# Patient Record
Sex: MALE | Race: WHITE | Employment: OTHER | URBAN - METROPOLITAN AREA
[De-identification: names, ages, dates, MRNs, and addresses within clinical notes are randomized per-mention and may not be internally consistent; named-entity substitution may affect disease eponyms.]

---

## 2017-03-15 ENCOUNTER — APPOINTMENT (OUTPATIENT)
Dept: GENERAL RADIOLOGY | Facility: HOSPITAL | Age: 67
End: 2017-03-15
Attending: EMERGENCY MEDICINE
Payer: MEDICARE

## 2017-03-15 ENCOUNTER — HOSPITAL ENCOUNTER (EMERGENCY)
Facility: HOSPITAL | Age: 67
Discharge: HOME | End: 2017-03-15
Attending: EMERGENCY MEDICINE
Payer: MEDICARE

## 2017-03-15 VITALS
HEIGHT: 71 IN | SYSTOLIC BLOOD PRESSURE: 109 MMHG | TEMPERATURE: 99 F | OXYGEN SATURATION: 95 % | HEART RATE: 80 BPM | DIASTOLIC BLOOD PRESSURE: 60 MMHG | BODY MASS INDEX: 29.4 KG/M2 | RESPIRATION RATE: 16 BRPM | WEIGHT: 210 LBS

## 2017-03-15 DIAGNOSIS — J18.9 COMMUNITY ACQUIRED PNEUMONIA: Primary | ICD-10-CM

## 2017-03-15 LAB
BASOPHILS # BLD AUTO: 0.02 X10(3) UL (ref 0–0.1)
BASOPHILS NFR BLD AUTO: 0.3 %
BUN BLD-MCNC: 16 MG/DL (ref 8–20)
CALCIUM BLD-MCNC: 8.7 MG/DL (ref 8.3–10.3)
CHLORIDE: 106 MMOL/L (ref 101–111)
CO2: 26 MMOL/L (ref 22–32)
CREAT BLD-MCNC: 1.01 MG/DL (ref 0.7–1.3)
EOSINOPHIL # BLD AUTO: 0.01 X10(3) UL (ref 0–0.3)
EOSINOPHIL NFR BLD AUTO: 0.2 %
ERYTHROCYTE [DISTWIDTH] IN BLOOD BY AUTOMATED COUNT: 13.2 % (ref 11.5–16)
GLUCOSE BLD-MCNC: 105 MG/DL (ref 70–99)
HCT VFR BLD AUTO: 39.1 % (ref 37–53)
HGB BLD-MCNC: 13 G/DL (ref 13–17)
IMMATURE GRANULOCYTE COUNT: 0.01 X10(3) UL (ref 0–1)
IMMATURE GRANULOCYTE RATIO %: 0.2 %
LARGE PLATELETS: PRESENT
LYMPHOCYTES # BLD AUTO: 1.21 X10(3) UL (ref 0.9–4)
LYMPHOCYTES NFR BLD AUTO: 18.5 %
MCH RBC QN AUTO: 33.2 PG (ref 27–33.2)
MCHC RBC AUTO-ENTMCNC: 33.2 G/DL (ref 31–37)
MCV RBC AUTO: 100 FL (ref 80–99)
MONOCYTES # BLD AUTO: 0.55 X10(3) UL (ref 0.1–0.6)
MONOCYTES NFR BLD AUTO: 8.4 %
NEUTROPHIL ABS PRELIM: 4.74 X10 (3) UL (ref 1.3–6.7)
NEUTROPHILS # BLD AUTO: 4.74 X10(3) UL (ref 1.3–6.7)
NEUTROPHILS NFR BLD AUTO: 72.4 %
PLATELET # BLD AUTO: 155 10(3)UL (ref 150–450)
POTASSIUM SERPL-SCNC: 3.7 MMOL/L (ref 3.6–5.1)
RBC # BLD AUTO: 3.91 X10(6)UL (ref 3.8–5.8)
RED CELL DISTRIBUTION WIDTH-SD: 48.2 FL (ref 35.1–46.3)
SODIUM SERPL-SCNC: 139 MMOL/L (ref 136–144)
TROPONIN: <0.046 NG/ML (ref ?–0.05)
WBC # BLD AUTO: 6.5 X10(3) UL (ref 4–13)

## 2017-03-15 PROCEDURE — 96365 THER/PROPH/DIAG IV INF INIT: CPT

## 2017-03-15 PROCEDURE — 87999 UNLISTED MICROBIOLOGY PX: CPT

## 2017-03-15 PROCEDURE — 93005 ELECTROCARDIOGRAM TRACING: CPT

## 2017-03-15 PROCEDURE — 87633 RESP VIRUS 12-25 TARGETS: CPT | Performed by: EMERGENCY MEDICINE

## 2017-03-15 PROCEDURE — 93010 ELECTROCARDIOGRAM REPORT: CPT

## 2017-03-15 PROCEDURE — 96361 HYDRATE IV INFUSION ADD-ON: CPT

## 2017-03-15 PROCEDURE — 71020 XR CHEST PA + LAT CHEST (CPT=71020): CPT

## 2017-03-15 PROCEDURE — 36415 COLL VENOUS BLD VENIPUNCTURE: CPT

## 2017-03-15 PROCEDURE — 84484 ASSAY OF TROPONIN QUANT: CPT | Performed by: EMERGENCY MEDICINE

## 2017-03-15 PROCEDURE — 87581 M.PNEUMON DNA AMP PROBE: CPT | Performed by: EMERGENCY MEDICINE

## 2017-03-15 PROCEDURE — 99285 EMERGENCY DEPT VISIT HI MDM: CPT

## 2017-03-15 PROCEDURE — 80048 BASIC METABOLIC PNL TOTAL CA: CPT | Performed by: EMERGENCY MEDICINE

## 2017-03-15 PROCEDURE — 87040 BLOOD CULTURE FOR BACTERIA: CPT | Performed by: EMERGENCY MEDICINE

## 2017-03-15 PROCEDURE — 87798 DETECT AGENT NOS DNA AMP: CPT | Performed by: EMERGENCY MEDICINE

## 2017-03-15 PROCEDURE — 99284 EMERGENCY DEPT VISIT MOD MDM: CPT

## 2017-03-15 PROCEDURE — 87486 CHLMYD PNEUM DNA AMP PROBE: CPT | Performed by: EMERGENCY MEDICINE

## 2017-03-15 PROCEDURE — 85025 COMPLETE CBC W/AUTO DIFF WBC: CPT | Performed by: EMERGENCY MEDICINE

## 2017-03-15 RX ORDER — ASPIRIN 325 MG
325 TABLET ORAL DAILY
COMMUNITY

## 2017-03-15 RX ORDER — AZITHROMYCIN 250 MG/1
TABLET, FILM COATED ORAL
Qty: 1 PACKAGE | Refills: 0 | Status: SHIPPED | OUTPATIENT
Start: 2017-03-15 | End: 2017-03-20

## 2017-03-15 RX ORDER — AZITHROMYCIN 250 MG/1
500 TABLET, FILM COATED ORAL ONCE
Status: COMPLETED | OUTPATIENT
Start: 2017-03-15 | End: 2017-03-15

## 2017-03-15 NOTE — ED PROVIDER NOTES
Patient Seen in: [facility] Emergency Department    History   Patient presents with:  Fever Sepsis (infectious)    Stated Complaint: fever, chills x 5 days    HPI    Ted Suárez is a 53-year-old male coming with complaints of fever.   Patient had fever and auricular preauricular or mastoid tenderness. Oropharyngeal exam shows no uvula edema or shift. There is no tongue elevation and palatine tonsils show no purulent material or erythema.   No submandibular erythema and no tenderness along the sternocleidoma intervals and axes as noted on EKG Report.   Rate: 66  Rhythm: Sinus Rhythm  Reading: No areas of acute ST segment elevation or depression            MDM     Patient has no signs of hypoxia so he is good outpatient candidate he will be discharged home he wa

## 2017-03-15 NOTE — ED INITIAL ASSESSMENT (HPI)
Pt to ED from home with c/o fever, chills, cough, decreased appetite x5 days. Unsure of temperature, did not check at home.

## 2017-03-16 LAB
ATRIAL RATE: 66 BPM
P AXIS: 60 DEGREES
P-R INTERVAL: 130 MS
Q-T INTERVAL: 382 MS
QRS DURATION: 86 MS
QTC CALCULATION (BEZET): 400 MS
R AXIS: 17 DEGREES
T AXIS: 32 DEGREES
VENTRICULAR RATE: 66 BPM

## 2017-05-23 ENCOUNTER — HOSPITAL ENCOUNTER (OUTPATIENT)
Dept: GENERAL RADIOLOGY | Facility: HOSPITAL | Age: 67
Discharge: HOME | End: 2017-05-23
Attending: INTERNAL MEDICINE
Payer: MEDICARE

## 2017-05-23 DIAGNOSIS — J18.9 PNEUMONIA: ICD-10-CM

## 2017-05-23 PROCEDURE — 71020 XR CHEST PA + LAT CHEST (CPT=71020): CPT | Performed by: INTERNAL MEDICINE

## 2020-06-18 ENCOUNTER — HOSPITAL ENCOUNTER (OUTPATIENT)
Dept: CT IMAGING | Facility: HOSPITAL | Age: 70
Discharge: HOME | End: 2020-06-18
Attending: INTERNAL MEDICINE
Payer: MEDICARE

## 2020-06-18 DIAGNOSIS — R31.9 HEMATURIA: ICD-10-CM

## 2020-06-18 PROCEDURE — 74178 CT ABD&PLV WO CNTR FLWD CNTR: CPT | Performed by: INTERNAL MEDICINE

## 2020-06-18 PROCEDURE — 82565 ASSAY OF CREATININE: CPT

## 2020-07-20 ENCOUNTER — OFFICE VISIT (OUTPATIENT)
Dept: SURGERY | Facility: CLINIC | Age: 70
End: 2020-07-20
Payer: MEDICARE

## 2020-07-20 VITALS — SYSTOLIC BLOOD PRESSURE: 123 MMHG | DIASTOLIC BLOOD PRESSURE: 80 MMHG | HEART RATE: 54 BPM

## 2020-07-20 DIAGNOSIS — N20.0 RENAL CALCULI: ICD-10-CM

## 2020-07-20 DIAGNOSIS — N21.0 BLADDER CALCULUS: ICD-10-CM

## 2020-07-20 DIAGNOSIS — N28.1 BILATERAL RENAL CYSTS: ICD-10-CM

## 2020-07-20 DIAGNOSIS — N40.0 ENLARGED PROSTATE: ICD-10-CM

## 2020-07-20 DIAGNOSIS — R82.90 URINE FINDING: Primary | ICD-10-CM

## 2020-07-20 DIAGNOSIS — R39.9 LOWER URINARY TRACT SYMPTOMS: ICD-10-CM

## 2020-07-20 LAB
APPEARANCE: CLEAR
MULTISTIX LOT#: 1044 NUMERIC
PH, URINE: 5.5 (ref 4.5–8)
SPECIFIC GRAVITY: 1.25 (ref 1–1.03)
UROBILINOGEN,SEMI-QN: 1 MG/DL (ref 0–1.9)

## 2020-07-20 PROCEDURE — 76856 US EXAM PELVIC COMPLETE: CPT | Performed by: UROLOGY

## 2020-07-20 PROCEDURE — 81003 URINALYSIS AUTO W/O SCOPE: CPT | Performed by: UROLOGY

## 2020-07-20 PROCEDURE — 99203 OFFICE O/P NEW LOW 30 MIN: CPT | Performed by: UROLOGY

## 2020-07-20 NOTE — PROGRESS NOTES
Rooming Clinician:     Esperanza Mere is a 78year old male.   Patient presents with:  Consult: CT scan shows kidney stones  Kidney Stones / Ureteral Stone  Pain: No   left  Nausea: No    Vomiting: No    Previous Stones: Yes: yes in 2015-passed    Chemical A Normal appendix. ABDOMINAL WALL:  Stable small fat containing umbilical hernia. Postsurgical changes of hernia repair in the left lower quadrant.   URINARY BLADDER:  2 adjacent calculi in the dependent aspect of the bladder lumen on the left, measuring 9 Alcohol use: Not on file    Drug use: Not on file       REVIEW OF SYSTEMS:     GENERAL HEALTH: feels well otherwise  SKIN: denies any unusual skin lesions or rashes  RESPIRATORY: denies shortness of breath with exertion  CARDIOVASCULAR: denies chest pain o symptoms  Renal calculi  Bilateral renal cysts    PLAN:     Tamsulosin 0.4 mg p.o. daily  Casual cystoscopy, cystolitholopaxy under general anesthesia. High-energy holmium laser.     I have discussed BPH with the patient including its pathophysiology and m

## 2020-07-24 ENCOUNTER — TELEPHONE (OUTPATIENT)
Dept: SURGERY | Facility: CLINIC | Age: 70
End: 2020-07-24

## 2020-07-24 DIAGNOSIS — N21.0 BLADDER STONE: Primary | ICD-10-CM

## 2020-07-24 NOTE — TELEPHONE ENCOUNTER
RN called patient that his request will be forwarded to the Surgery Scheduler. See Dr Austin Jeffers notes on 7/20:  PLAN:      Tamsulosin 0.4 mg p.o. daily  Casual cystoscopy, cystolitholopaxy under general anesthesia. High-energy holmium laser.     Alexis

## 2020-07-31 NOTE — TELEPHONE ENCOUNTER
cystoscopy, cystolitholopaxy under general anesthesia. High-energy holmium laser scheduled for 9/16/20 at [facility]. Reviewed pre op instructions with patient.  Verbalized understanding.

## 2020-09-10 RX ORDER — EZETIMIBE 10 MG/1
10 TABLET ORAL NIGHTLY
COMMUNITY

## 2020-09-14 ENCOUNTER — APPOINTMENT (OUTPATIENT)
Dept: LAB | Facility: HOSPITAL | Age: 70
End: 2020-09-14
Attending: UROLOGY
Payer: MEDICARE

## 2020-09-14 DIAGNOSIS — N21.0 BLADDER STONE: ICD-10-CM

## 2020-09-14 NOTE — H&P
The patient has a history of several episodes of gross hematuria over the last several months. He noted some asymptomatic dark-colored urine which cleared over the course of at least 24 hours. He had no pain at that time.   He has a history of a kidne and size. The prostate exam is enlarged, about 35 to 40 cc and benign. BACK: no CVA tenderness  NEURO: grossly normal  EXTREMITIES: no cyanosis, clubbing or edema        The patient was allowed to void and the lower abdomen was scanned.   At this point explained the rationale for using them in the treatment of lower urinary tract symptoms due to an enlarged prostate and at times to encourage the passage of ureteral calculi. I explained the side effects of orthostatic hypotension, asthenia, and dizziness.

## 2020-09-15 ENCOUNTER — ANESTHESIA EVENT (OUTPATIENT)
Dept: SURGERY | Facility: HOSPITAL | Age: 70
End: 2020-09-15
Payer: MEDICARE

## 2020-09-15 LAB — SARS-COV-2 RNA RESP QL NAA+PROBE: NOT DETECTED

## 2020-09-16 ENCOUNTER — ANESTHESIA (OUTPATIENT)
Dept: SURGERY | Facility: HOSPITAL | Age: 70
End: 2020-09-16
Payer: MEDICARE

## 2020-09-16 ENCOUNTER — TELEPHONE (OUTPATIENT)
Dept: SURGERY | Facility: CLINIC | Age: 70
End: 2020-09-16

## 2020-09-16 ENCOUNTER — HOSPITAL ENCOUNTER (OUTPATIENT)
Facility: HOSPITAL | Age: 70
Setting detail: HOSPITAL OUTPATIENT SURGERY
Discharge: HOME | End: 2020-09-16
Attending: UROLOGY | Admitting: UROLOGY
Payer: MEDICARE

## 2020-09-16 ENCOUNTER — APPOINTMENT (OUTPATIENT)
Dept: GENERAL RADIOLOGY | Facility: HOSPITAL | Age: 70
End: 2020-09-16
Attending: UROLOGY
Payer: MEDICARE

## 2020-09-16 VITALS
SYSTOLIC BLOOD PRESSURE: 118 MMHG | BODY MASS INDEX: 29.23 KG/M2 | WEIGHT: 208.75 LBS | OXYGEN SATURATION: 100 % | HEIGHT: 71 IN | DIASTOLIC BLOOD PRESSURE: 78 MMHG | RESPIRATION RATE: 18 BRPM | TEMPERATURE: 98 F | HEART RATE: 73 BPM

## 2020-09-16 DIAGNOSIS — N21.0 BLADDER STONE: Primary | ICD-10-CM

## 2020-09-16 PROCEDURE — 0TCB8ZZ EXTIRPATION OF MATTER FROM BLADDER, VIA NATURAL OR ARTIFICIAL OPENING ENDOSCOPIC: ICD-10-PCS | Performed by: UROLOGY

## 2020-09-16 PROCEDURE — 52317 REMOVE BLADDER STONE: CPT | Performed by: UROLOGY

## 2020-09-16 RX ORDER — PHENYLEPHRINE HCL 10 MG/ML
VIAL (ML) INJECTION AS NEEDED
Status: DISCONTINUED | OUTPATIENT
Start: 2020-09-16 | End: 2020-09-16 | Stop reason: SURG

## 2020-09-16 RX ORDER — CEFAZOLIN SODIUM/WATER 2 G/20 ML
2 SYRINGE (ML) INTRAVENOUS ONCE
Status: COMPLETED | OUTPATIENT
Start: 2020-09-16 | End: 2020-09-16

## 2020-09-16 RX ORDER — SODIUM CHLORIDE, SODIUM LACTATE, POTASSIUM CHLORIDE, CALCIUM CHLORIDE 600; 310; 30; 20 MG/100ML; MG/100ML; MG/100ML; MG/100ML
INJECTION, SOLUTION INTRAVENOUS CONTINUOUS
Status: DISCONTINUED | OUTPATIENT
Start: 2020-09-16 | End: 2020-09-16

## 2020-09-16 RX ORDER — HYDROMORPHONE HYDROCHLORIDE 1 MG/ML
0.4 INJECTION, SOLUTION INTRAMUSCULAR; INTRAVENOUS; SUBCUTANEOUS EVERY 5 MIN PRN
Status: DISCONTINUED | OUTPATIENT
Start: 2020-09-16 | End: 2020-09-16

## 2020-09-16 RX ORDER — ONDANSETRON 2 MG/ML
INJECTION INTRAMUSCULAR; INTRAVENOUS AS NEEDED
Status: DISCONTINUED | OUTPATIENT
Start: 2020-09-16 | End: 2020-09-16 | Stop reason: SURG

## 2020-09-16 RX ORDER — HYDROCODONE BITARTRATE AND ACETAMINOPHEN 5; 325 MG/1; MG/1
2 TABLET ORAL AS NEEDED
Status: DISCONTINUED | OUTPATIENT
Start: 2020-09-16 | End: 2020-09-16

## 2020-09-16 RX ORDER — HYDROCODONE BITARTRATE AND ACETAMINOPHEN 5; 325 MG/1; MG/1
1 TABLET ORAL AS NEEDED
Status: DISCONTINUED | OUTPATIENT
Start: 2020-09-16 | End: 2020-09-16

## 2020-09-16 RX ORDER — ACETAMINOPHEN 500 MG
1000 TABLET ORAL ONCE
COMMUNITY

## 2020-09-16 RX ORDER — DEXAMETHASONE SODIUM PHOSPHATE 4 MG/ML
VIAL (ML) INJECTION AS NEEDED
Status: DISCONTINUED | OUTPATIENT
Start: 2020-09-16 | End: 2020-09-16 | Stop reason: SURG

## 2020-09-16 RX ORDER — ROCURONIUM BROMIDE 10 MG/ML
INJECTION, SOLUTION INTRAVENOUS AS NEEDED
Status: DISCONTINUED | OUTPATIENT
Start: 2020-09-16 | End: 2020-09-16 | Stop reason: SURG

## 2020-09-16 RX ORDER — ACETAMINOPHEN 500 MG
1000 TABLET ORAL ONCE AS NEEDED
Status: DISCONTINUED | OUTPATIENT
Start: 2020-09-16 | End: 2020-09-16

## 2020-09-16 RX ORDER — LIDOCAINE HYDROCHLORIDE 10 MG/ML
INJECTION, SOLUTION EPIDURAL; INFILTRATION; INTRACAUDAL; PERINEURAL AS NEEDED
Status: DISCONTINUED | OUTPATIENT
Start: 2020-09-16 | End: 2020-09-16 | Stop reason: SURG

## 2020-09-16 RX ORDER — METOCLOPRAMIDE HYDROCHLORIDE 5 MG/ML
INJECTION INTRAMUSCULAR; INTRAVENOUS AS NEEDED
Status: DISCONTINUED | OUTPATIENT
Start: 2020-09-16 | End: 2020-09-16 | Stop reason: SURG

## 2020-09-16 RX ORDER — ACETAMINOPHEN 500 MG
1000 TABLET ORAL ONCE
Status: DISCONTINUED | OUTPATIENT
Start: 2020-09-16 | End: 2020-09-16 | Stop reason: HOSPADM

## 2020-09-16 RX ORDER — NALOXONE HYDROCHLORIDE 0.4 MG/ML
80 INJECTION, SOLUTION INTRAMUSCULAR; INTRAVENOUS; SUBCUTANEOUS AS NEEDED
Status: DISCONTINUED | OUTPATIENT
Start: 2020-09-16 | End: 2020-09-16

## 2020-09-16 RX ADMIN — ROCURONIUM BROMIDE 50 MG: 10 INJECTION, SOLUTION INTRAVENOUS at 07:45:00

## 2020-09-16 RX ADMIN — METOCLOPRAMIDE HYDROCHLORIDE 10 MG: 5 INJECTION INTRAMUSCULAR; INTRAVENOUS at 07:45:00

## 2020-09-16 RX ADMIN — LIDOCAINE HYDROCHLORIDE 50 MG: 10 INJECTION, SOLUTION EPIDURAL; INFILTRATION; INTRACAUDAL; PERINEURAL at 07:45:00

## 2020-09-16 RX ADMIN — PHENYLEPHRINE HCL 100 MCG: 10 MG/ML VIAL (ML) INJECTION at 08:02:00

## 2020-09-16 RX ADMIN — DEXAMETHASONE SODIUM PHOSPHATE 4 MG: 4 MG/ML VIAL (ML) INJECTION at 07:45:00

## 2020-09-16 RX ADMIN — CEFAZOLIN SODIUM/WATER 2 G: 2 G/20 ML SYRINGE (ML) INTRAVENOUS at 07:54:00

## 2020-09-16 RX ADMIN — SODIUM CHLORIDE, SODIUM LACTATE, POTASSIUM CHLORIDE, CALCIUM CHLORIDE: 600; 310; 30; 20 INJECTION, SOLUTION INTRAVENOUS at 08:26:00

## 2020-09-16 RX ADMIN — PHENYLEPHRINE HCL 100 MCG: 10 MG/ML VIAL (ML) INJECTION at 08:05:00

## 2020-09-16 RX ADMIN — PHENYLEPHRINE HCL 100 MCG: 10 MG/ML VIAL (ML) INJECTION at 08:12:00

## 2020-09-16 RX ADMIN — ONDANSETRON 4 MG: 2 INJECTION INTRAMUSCULAR; INTRAVENOUS at 07:45:00

## 2020-09-16 NOTE — OPERATIVE REPORT
Operative Note    Patient Name: Ananth Gaona    Date of Procedure: 9/16/2020    Preoperative Diagnosis: Bladder calculus, BPH    Postoperative Diagnosis: Same    Primary Surgeon: Neisha Malloy. Russell Waller M.D.      Procedure Performed: Cystoscopy, cystosco scan were then visualized. 2 smaller ones were irrigated from the bladder and the larger 9 mm stone was then fractured into multiple small pieces using the holmium laser. Pieces were irrigated from the bladder.   Further inspection of the bladder using th

## 2020-09-16 NOTE — ANESTHESIA PREPROCEDURE EVALUATION
PRE-OP EVALUATION    Patient Name: Julinaa Beltran    Pre-op Diagnosis: Bladder stone [N21.0]    Procedure(s):  cystoscopy, cystolitholapaxy with  High-energy holmium laser.     Surgeon(s) and Role:     * Bridgette Arenas MD - Primary    Pre-op vital use: Unknown     Available pre-op labs reviewed. Airway      Mallampati: II  Mouth opening: >3 FB  TM distance: > 6 cm  Neck ROM: full Cardiovascular      Rhythm: regular  Rate: normal     Dental    No notable dental history.          Pulmonar

## 2020-09-16 NOTE — ANESTHESIA PROCEDURE NOTES
Airway  Date/Time: 9/16/2020 7:48 AM  Urgency: elective    Airway not difficult    General Information and Staff    Patient location during procedure: OR  Anesthesiologist: Abdelrahman Gray MD  Performed: anesthesiologist     Indications and Patient Cond

## 2020-09-16 NOTE — INTERVAL H&P NOTE
Pre-op Diagnosis: Bladder stone [N21.0]    The above referenced H&P was reviewed by Solomon Arriola MD on 9/16/2020, the patient was examined and no significant changes have occurred in the patient's condition since the H&P was performed.   I discussed wi

## 2020-09-16 NOTE — ANESTHESIA POSTPROCEDURE EVALUATION
[facility] Patient Status:  Hospital Outpatient Surgery   Age/Gender 78year old male MRN [id number]   [facility] SURGERY Attending Chip Andrade MD   Hosp Day # 0 PCP Pari Franz MD       Anesthesia Post-op

## 2020-09-20 LAB
CALCULI MASS: 320 MG
CALCULI NUMBER: 5

## 2020-10-01 ENCOUNTER — OFFICE VISIT (OUTPATIENT)
Dept: SURGERY | Facility: CLINIC | Age: 70
End: 2020-10-01
Payer: MEDICARE

## 2020-10-01 VITALS — DIASTOLIC BLOOD PRESSURE: 71 MMHG | HEART RATE: 71 BPM | SYSTOLIC BLOOD PRESSURE: 116 MMHG

## 2020-10-01 DIAGNOSIS — R82.90 URINE FINDING: Primary | ICD-10-CM

## 2020-10-01 DIAGNOSIS — N21.0 BLADDER CALCULI: ICD-10-CM

## 2020-10-01 PROCEDURE — 99213 OFFICE O/P EST LOW 20 MIN: CPT | Performed by: UROLOGY

## 2020-10-01 PROCEDURE — 81003 URINALYSIS AUTO W/O SCOPE: CPT | Performed by: UROLOGY

## 2020-10-01 NOTE — PROGRESS NOTES
Rooming Clinician:     Temitope Tripp is a 78year old male. Patient presents with: Follow - Up: c/o bladder stones, S/P CYSTOSCOPY LITHOTRIPSY        HPI:     Patient returns for follow-up. He had recent cystoscopy with cystoscopy litholapaxy.   [place] SYSTEMS:     GENERAL HEALTH: feels well otherwise  SKIN: denies any unusual skin lesions or rashes  RESPIRATORY: denies shortness of breath with exertion  CARDIOVASCULAR: denies chest pain on exertion  GI: denies abdominal pain and denies heartburn  : se

## 2021-01-15 ENCOUNTER — HOSPITAL ENCOUNTER (OUTPATIENT)
Age: 71
Discharge: HOME | End: 2021-01-15
Payer: MEDICARE

## 2021-01-15 VITALS
WEIGHT: 205 LBS | OXYGEN SATURATION: 97 % | HEART RATE: 68 BPM | TEMPERATURE: 98 F | BODY MASS INDEX: 28.7 KG/M2 | HEIGHT: 71 IN | SYSTOLIC BLOOD PRESSURE: 122 MMHG | DIASTOLIC BLOOD PRESSURE: 71 MMHG | RESPIRATION RATE: 18 BRPM

## 2021-01-15 DIAGNOSIS — Z20.822 ENCOUNTER FOR LABORATORY TESTING FOR COVID-19 VIRUS: Primary | ICD-10-CM

## 2021-01-15 LAB — SARS-COV-2 RNA RESP QL NAA+PROBE: NOT DETECTED

## 2021-01-15 PROCEDURE — 99202 OFFICE O/P NEW SF 15 MIN: CPT | Performed by: PHYSICIAN ASSISTANT

## 2021-01-15 NOTE — ED INITIAL ASSESSMENT (HPI)
Pt states he is her for COVID-19 test due to his wife having some s/s including sweating this morning and feeling like she had some CP x over the past 3 nights. Pt denies any s/s or known exposure to anyone with COVID-19.

## 2021-01-15 NOTE — ED PROVIDER NOTES
Patient Seen in: [facility] [address] [place], [place] [address]way      History   Patient presents with:  Covid-19 Test: Entered by patient    Stated Complaint: Covid-19 Test, EXPOSURE    HPI/Subjective:   HPI    CHIEF COMPLAINT: Covid test    HISTORY OF PRESENT ILLNESS: above.    Physical Exam     ED Triage Vitals [01/15/21 1115]   /71   Pulse 68   Resp 18   Temp 97.7 °F (36.5 °C)   Temp src Temporal   SpO2 97 %   O2 Device None (Room air)       Current:/71   Pulse 68   Temp 97.7 °F (36.5 °C) (Temporal)   Resp Prescribed:  Current Discharge Medication List

## 2021-04-01 ENCOUNTER — OFFICE VISIT (OUTPATIENT)
Dept: SURGERY | Facility: CLINIC | Age: 71
End: 2021-04-01
Payer: MEDICARE

## 2021-04-01 VITALS — HEART RATE: 65 BPM | SYSTOLIC BLOOD PRESSURE: 118 MMHG | DIASTOLIC BLOOD PRESSURE: 81 MMHG | TEMPERATURE: 97 F

## 2021-04-01 DIAGNOSIS — N40.0 ENLARGED PROSTATE: ICD-10-CM

## 2021-04-01 DIAGNOSIS — R33.9 INCOMPLETE EMPTYING OF BLADDER: ICD-10-CM

## 2021-04-01 DIAGNOSIS — R82.90 URINE FINDING: Primary | ICD-10-CM

## 2021-04-01 PROCEDURE — 76856 US EXAM PELVIC COMPLETE: CPT | Performed by: UROLOGY

## 2021-04-01 PROCEDURE — 81003 URINALYSIS AUTO W/O SCOPE: CPT | Performed by: UROLOGY

## 2021-04-01 PROCEDURE — 99213 OFFICE O/P EST LOW 20 MIN: CPT | Performed by: UROLOGY

## 2021-04-01 RX ORDER — TAMSULOSIN HYDROCHLORIDE 0.4 MG/1
0.4 CAPSULE ORAL DAILY
Qty: 90 CAPSULE | Refills: 3 | Status: SHIPPED | OUTPATIENT
Start: 2021-04-01 | End: 2021-05-01

## 2021-04-01 NOTE — PROGRESS NOTES
Rooming Clinician:     Jhony Yuan is a 69year old male. Patient presents with:   Follow - Up: h/o bladder stones        HPI:     Patient returns for follow-up exam.  He has a history of bladder stones for which he underwent cystolitholopaxy last breath with exertion  CARDIOVASCULAR: denies chest pain on exertion  GI: denies abdominal pain and denies heartburn  : see HPI  NEURO: no sensory or motor complaint    EXAM:     /81   Pulse 65   Temp 97.2 °F (36.2 °C)   GENERAL: well developed, wel various surgical procedures including TURP, photo vaporization of the prostate, open surgery, robotic suprapubic prostatectomy, vapoenucleation of the prostate as well as various interstitial treatments such as radiofrequency ablation, laser ablation, Urol

## 2021-04-01 NOTE — PATIENT INSTRUCTIONS
Text SUPPORT1 to 13354 to learn if you may be eligible for financial support with your medication(s). Msg & Data Rates May Apply. Msg freq varies. Terms apply. Text HELP for help. Text STOP to end.    Tamsulosin capsules  Brand Name: Flomax  What is th What if I miss a dose? If you miss a dose, take it as soon as you can. If it is almost time for your next dose, take only that dose. Do not take double or extra doses.  If you stop taking your medicine for several days or more, ask your doctor or health ca of a serious problem and must be treated right away to prevent permanent damage. If you are thinking of having cataract surgery, tell your eye surgeon that you have taken this medicine. NOTE:This sheet is a summary.  It may not cover all possible informat

## 2021-07-13 ENCOUNTER — OFFICE VISIT (OUTPATIENT)
Dept: SURGERY | Facility: CLINIC | Age: 71
End: 2021-07-13
Payer: MEDICARE

## 2021-07-13 DIAGNOSIS — N40.0 ENLARGED PROSTATE: ICD-10-CM

## 2021-07-13 DIAGNOSIS — R82.90 URINE FINDING: Primary | ICD-10-CM

## 2021-07-13 DIAGNOSIS — R33.9 INCOMPLETE EMPTYING OF BLADDER: ICD-10-CM

## 2021-07-13 LAB
APPEARANCE: CLEAR
BILIRUBIN: NEGATIVE
GLUCOSE (URINE DIPSTICK): NEGATIVE MG/DL
KETONES (URINE DIPSTICK): NEGATIVE MG/DL
LEUKOCYTES: NEGATIVE
MULTISTIX LOT#: NORMAL NUMERIC
NITRITE, URINE: NEGATIVE
OCCULT BLOOD: NEGATIVE
PH, URINE: 5.5 (ref 4.5–8)
PROTEIN (URINE DIPSTICK): NEGATIVE MG/DL
SPECIFIC GRAVITY: 1.01 (ref 1–1.03)
UROBILINOGEN,SEMI-QN: 0.2 MG/DL (ref 0–1.9)

## 2021-07-13 PROCEDURE — 81003 URINALYSIS AUTO W/O SCOPE: CPT | Performed by: UROLOGY

## 2021-07-13 PROCEDURE — 99213 OFFICE O/P EST LOW 20 MIN: CPT | Performed by: UROLOGY

## 2021-07-13 PROCEDURE — 51798 US URINE CAPACITY MEASURE: CPT | Performed by: UROLOGY

## 2021-07-13 RX ORDER — TAMSULOSIN HYDROCHLORIDE 0.4 MG/1
CAPSULE ORAL
COMMUNITY
Start: 2021-06-27

## 2021-07-13 NOTE — PROGRESS NOTES
Rooming Clinician:     Byron Plunkett is a 69year old male. Patient presents with: Follow - Up: Here for follow up and PVR        HPI:     Patient has a history of a bladder calculus status post cystoscopy litholapaxy.   Several months ago he was n denies abdominal pain and denies heartburn  : see HPI  NEURO: no sensory or motor complaint    EXAM:     There were no vitals taken for this visit.   GENERAL: well developed, well nourished,in no apparent distress  SKIN: no rashes,no suspicious lesions  H

## 2021-11-09 ENCOUNTER — OFFICE VISIT (OUTPATIENT)
Dept: SURGERY | Facility: CLINIC | Age: 71
End: 2021-11-09
Payer: MEDICARE

## 2021-11-09 DIAGNOSIS — R39.9 LOWER URINARY TRACT SYMPTOMS: ICD-10-CM

## 2021-11-09 DIAGNOSIS — R33.9 INCOMPLETE EMPTYING OF BLADDER: ICD-10-CM

## 2021-11-09 DIAGNOSIS — N40.0 ENLARGED PROSTATE: ICD-10-CM

## 2021-11-09 DIAGNOSIS — R82.90 URINE FINDING: Primary | ICD-10-CM

## 2021-11-09 PROCEDURE — 76856 US EXAM PELVIC COMPLETE: CPT | Performed by: UROLOGY

## 2021-11-09 PROCEDURE — 99213 OFFICE O/P EST LOW 20 MIN: CPT | Performed by: UROLOGY

## 2021-11-09 PROCEDURE — 81003 URINALYSIS AUTO W/O SCOPE: CPT | Performed by: UROLOGY

## 2021-11-09 NOTE — PROGRESS NOTES
Post-Op Assessment Note      CV Status:  Stable    Mental Status:  Alert and awake    Hydration Status:  Euvolemic    PONV Controlled:  Controlled    Airway Patency:  Patent    Post Op Vitals Reviewed: Yes          Staff: CRNA           /71 (12/31/18 1039)    Temp      Pulse 66 (12/31/18 1039)   Resp 16 (12/31/18 1039)    SpO2 97 % (12/31/18 1039) Rooming Clinician:     Micaela Luna is a 66year old male. Patient presents with: Follow - Up: BPH        HPI:     Patient returns for follow-up exam.  He has a history of bladder calculus and underwent cystoscopy and cystolitholapaxy 1 year ago. denies chest pain on exertion  GI: denies abdominal pain and denies heartburn  : see HPI  NEURO: no sensory or motor complaint    EXAM:     There were no vitals taken for this visit.   GENERAL: well developed, well nourished,in no apparent distress  SKIN: ultrasound in office    Diagnoses and all orders for this visit:    Urine finding  -     URINALYSIS, AUTO, W/O SCOPE    Enlarged prostate    Incomplete emptying of bladder    Lower urinary tract symptoms        Follow up examination in 6 months    The hortencia

## 2022-05-04 RX ORDER — TAMSULOSIN HYDROCHLORIDE 0.4 MG/1
CAPSULE ORAL
Qty: 90 CAPSULE | Refills: 2 | Status: SHIPPED | OUTPATIENT
Start: 2022-05-04

## 2022-10-18 ENCOUNTER — PATIENT MESSAGE (OUTPATIENT)
Dept: SURGERY | Facility: CLINIC | Age: 72
End: 2022-10-18

## 2022-10-18 ENCOUNTER — TELEPHONE (OUTPATIENT)
Dept: SURGERY | Facility: CLINIC | Age: 72
End: 2022-10-18

## 2022-10-18 DIAGNOSIS — R31.0 GROSS HEMATURIA: Primary | ICD-10-CM

## 2022-10-19 ENCOUNTER — PATIENT MESSAGE (OUTPATIENT)
Dept: SURGERY | Facility: CLINIC | Age: 72
End: 2022-10-19

## 2022-10-19 NOTE — TELEPHONE ENCOUNTER
Pt states he had a small clump of blood come while urinating on Monday night. States he passed this small clump of blood and then after he passed this he then urinated yellow urine. Pt states he has not noticed any further blood in urine since then. Today pt states urine is light yellow. Denies burning, denies foul smell, no cloudiness, denies pain as well. Asked pt if he was straining Monday night while urinating and pt denies. Denies fevers or flu-like sx. Pt has history of bladder stones 2021, pt is concerned about this episode being related but states last year blood was mixed in with urine and not isolated as a clump like he noticed on Monday night. Pt is currently in [place] and will be back next week for one week and then leaving out of country after next week. Wondering if he needs to complete any testing for this while he is in town. Routed to Dr Evette Fox;  Please advise, thank you.

## 2022-10-19 NOTE — TELEPHONE ENCOUNTER
Pt sent a mychart appointment request for 10-24-22 thru 10-28-22 for discharge blood in the urine on Monday night 10-17-22. No appointments available.   Please call pt

## 2022-10-25 ENCOUNTER — LAB ENCOUNTER (OUTPATIENT)
Dept: LAB | Facility: HOSPITAL | Age: 72
End: 2022-10-25
Attending: UROLOGY
Payer: MEDICARE

## 2022-10-25 ENCOUNTER — HOSPITAL ENCOUNTER (OUTPATIENT)
Dept: GENERAL RADIOLOGY | Facility: HOSPITAL | Age: 72
Discharge: HOME | End: 2022-10-25
Attending: UROLOGY
Payer: MEDICARE

## 2022-10-25 DIAGNOSIS — R31.0 GROSS HEMATURIA: ICD-10-CM

## 2022-10-25 LAB
BILIRUB UR QL STRIP.AUTO: NEGATIVE
CLARITY UR REFRACT.AUTO: CLEAR
COLOR UR AUTO: YELLOW
GLUCOSE UR STRIP.AUTO-MCNC: NEGATIVE MG/DL
KETONES UR STRIP.AUTO-MCNC: NEGATIVE MG/DL
NITRITE UR QL STRIP.AUTO: NEGATIVE
PH UR STRIP.AUTO: 6 [PH] (ref 5–8)
PROT UR STRIP.AUTO-MCNC: NEGATIVE MG/DL
RBC UR QL AUTO: NEGATIVE
SP GR UR STRIP.AUTO: 1.02 (ref 1–1.03)
UROBILINOGEN UR STRIP.AUTO-MCNC: <2 MG/DL

## 2022-10-25 PROCEDURE — 81001 URINALYSIS AUTO W/SCOPE: CPT

## 2022-10-25 PROCEDURE — 74018 RADEX ABDOMEN 1 VIEW: CPT | Performed by: UROLOGY

## 2022-10-25 PROCEDURE — 87086 URINE CULTURE/COLONY COUNT: CPT

## 2022-10-25 NOTE — PROGRESS NOTES
Your recent KUB x-ray is normal.  Shows no evidence of bladder stones. Recommend follow up in the office as directed.     Sincerely,  Sae Voss MD

## 2022-10-25 NOTE — PROGRESS NOTES
Your recent urinalysis shows no evidence of red blood cells, white blood cells or bacteria and is normal.  Recommend follow up in the office as directed.     Sincerely,  Sandhya Jsoeph MD

## 2022-10-26 ENCOUNTER — HOSPITAL ENCOUNTER (OUTPATIENT)
Dept: ULTRASOUND IMAGING | Age: 72
Discharge: HOME | End: 2022-10-26
Attending: UROLOGY
Payer: MEDICARE

## 2022-10-26 DIAGNOSIS — R31.0 GROSS HEMATURIA: ICD-10-CM

## 2022-10-26 PROCEDURE — 76770 US EXAM ABDO BACK WALL COMP: CPT | Performed by: UROLOGY

## 2022-10-27 ENCOUNTER — PATIENT MESSAGE (OUTPATIENT)
Dept: SURGERY | Facility: CLINIC | Age: 72
End: 2022-10-27

## 2022-10-31 NOTE — TELEPHONE ENCOUNTER
From: Ava Portillo  Sent: 10/27/2022 8:08 PM CDT  To: [facility] Clinical Staff  Subject: Dk Beck    I will be out of town for 2 weeks starting Oct. 30. When should I make an appointment to see Dr. Felisha Stanley?     Francia Patel

## 2022-10-31 NOTE — PROGRESS NOTES
Your recent urine culture shows no infection and is normal.  Recommend follow up in the office as directed.     Sincerely,  Rebeca Delatorre MD

## 2022-10-31 NOTE — PROGRESS NOTES
Your recent renal and bladder US shows bilateral renal cysts and small right kidney stones but no bladder stone. 5oz residual after voiding. Recommend follow up in the office as directed.     Sincerely,  Mars Campos MD

## 2022-11-14 ENCOUNTER — HOSPITAL ENCOUNTER (OUTPATIENT)
Age: 72
Discharge: HOME | End: 2022-11-14
Payer: MEDICARE

## 2022-11-14 VITALS
SYSTOLIC BLOOD PRESSURE: 125 MMHG | OXYGEN SATURATION: 97 % | BODY MASS INDEX: 28 KG/M2 | WEIGHT: 200 LBS | HEIGHT: 71 IN | RESPIRATION RATE: 18 BRPM | HEART RATE: 64 BPM | TEMPERATURE: 98 F | DIASTOLIC BLOOD PRESSURE: 75 MMHG

## 2022-11-14 DIAGNOSIS — H66.011 NON-RECURRENT ACUTE SUPPURATIVE OTITIS MEDIA OF RIGHT EAR WITH SPONTANEOUS RUPTURE OF TYMPANIC MEMBRANE: Primary | ICD-10-CM

## 2022-11-14 PROCEDURE — 99213 OFFICE O/P EST LOW 20 MIN: CPT | Performed by: PHYSICIAN ASSISTANT

## 2022-11-14 RX ORDER — AMOXICILLIN 875 MG/1
875 TABLET, COATED ORAL 2 TIMES DAILY
Qty: 20 TABLET | Refills: 0 | Status: SHIPPED | OUTPATIENT
Start: 2022-11-14 | End: 2022-11-24

## 2022-11-14 NOTE — ED INITIAL ASSESSMENT (HPI)
Ear pain and pressure since Saturday. Yesterday had bleeding from ear. Recent travel over seas. No fevers.

## 2022-12-01 ENCOUNTER — OFFICE VISIT (OUTPATIENT)
Facility: LOCATION | Age: 72
End: 2022-12-01
Payer: MEDICARE

## 2022-12-01 DIAGNOSIS — H69.93 UNSPECIFIED EUSTACHIAN TUBE DISORDER, BILATERAL: ICD-10-CM

## 2022-12-01 DIAGNOSIS — H66.001 RIGHT ACUTE SUPPURATIVE OTITIS MEDIA: Primary | ICD-10-CM

## 2022-12-01 DIAGNOSIS — H72.91 TYMPANIC MEMBRANE PERFORATION, RIGHT: Primary | ICD-10-CM

## 2022-12-01 DIAGNOSIS — H90.11 CONDCTV HEAR LOSS, UNI, RIGHT EAR, W UNRESTR HEAR CNTRA SIDE: ICD-10-CM

## 2022-12-01 DIAGNOSIS — H93.291 ABNORMAL AUDITORY PERCEPTION OF RIGHT EAR: ICD-10-CM

## 2022-12-01 PROCEDURE — 99204 OFFICE O/P NEW MOD 45 MIN: CPT | Performed by: OTOLARYNGOLOGY

## 2022-12-01 PROCEDURE — 92557 COMPREHENSIVE HEARING TEST: CPT | Performed by: AUDIOLOGIST

## 2022-12-01 PROCEDURE — 92567 TYMPANOMETRY: CPT | Performed by: AUDIOLOGIST

## 2022-12-01 RX ORDER — PREDNISONE 20 MG/1
TABLET ORAL
Qty: 18 TABLET | Refills: 1 | Status: SHIPPED | OUTPATIENT
Start: 2022-12-01

## 2022-12-01 RX ORDER — CEPHALEXIN 500 MG/1
500 CAPSULE ORAL 3 TIMES DAILY
Qty: 30 CAPSULE | Refills: 0 | Status: SHIPPED | OUTPATIENT
Start: 2022-12-01 | End: 2022-12-11

## 2022-12-01 NOTE — PROGRESS NOTES
Danny Sosa was seen for an audiometric evaluation and tympanogram today. Referred back to physician.     Julia Ponce, MS, CCC-A

## 2022-12-29 ENCOUNTER — OFFICE VISIT (OUTPATIENT)
Facility: LOCATION | Age: 72
End: 2022-12-29
Payer: MEDICARE

## 2022-12-29 DIAGNOSIS — H69.93 UNSPECIFIED EUSTACHIAN TUBE DISORDER, BILATERAL: ICD-10-CM

## 2022-12-29 DIAGNOSIS — H90.11 CONDCTV HEAR LOSS, UNI, RIGHT EAR, W UNRESTR HEAR CNTRA SIDE: Primary | ICD-10-CM

## 2022-12-29 DIAGNOSIS — H93.291 ABNORMAL AUDITORY PERCEPTION OF RIGHT EAR: Primary | ICD-10-CM

## 2022-12-29 PROCEDURE — 92552 PURE TONE AUDIOMETRY AIR: CPT | Performed by: AUDIOLOGIST

## 2022-12-29 PROCEDURE — 92567 TYMPANOMETRY: CPT | Performed by: AUDIOLOGIST

## 2022-12-29 PROCEDURE — 99213 OFFICE O/P EST LOW 20 MIN: CPT | Performed by: OTOLARYNGOLOGY

## 2022-12-29 RX ORDER — SULFAMETHOXAZOLE AND TRIMETHOPRIM 800; 160 MG/1; MG/1
1 TABLET ORAL 2 TIMES DAILY
Qty: 20 TABLET | Refills: 0 | Status: SHIPPED | OUTPATIENT
Start: 2022-12-29 | End: 2023-01-01

## 2022-12-29 RX ORDER — PREDNISONE 20 MG/1
TABLET ORAL
Qty: 18 TABLET | Refills: 1 | Status: SHIPPED | OUTPATIENT
Start: 2022-12-29 | End: 2022-12-31

## 2022-12-31 ENCOUNTER — HOSPITAL ENCOUNTER (OUTPATIENT)
Age: 72
Discharge: ANOTHER HOSPITAL | End: 2022-12-31
Payer: MEDICARE

## 2022-12-31 ENCOUNTER — HOSPITAL ENCOUNTER (INPATIENT)
Facility: HOSPITAL | Age: 72
LOS: 1 days | Discharge: HOME | End: 2023-01-01
Attending: EMERGENCY MEDICINE | Admitting: HOSPITALIST
Payer: MEDICARE

## 2022-12-31 ENCOUNTER — APPOINTMENT (OUTPATIENT)
Dept: CT IMAGING | Facility: HOSPITAL | Age: 72
End: 2022-12-31
Attending: EMERGENCY MEDICINE
Payer: MEDICARE

## 2022-12-31 VITALS
RESPIRATION RATE: 17 BRPM | OXYGEN SATURATION: 99 % | SYSTOLIC BLOOD PRESSURE: 125 MMHG | DIASTOLIC BLOOD PRESSURE: 83 MMHG | TEMPERATURE: 98 F | HEART RATE: 56 BPM

## 2022-12-31 DIAGNOSIS — R19.5 DARK STOOLS: ICD-10-CM

## 2022-12-31 DIAGNOSIS — E87.5 HYPERKALEMIA: Primary | ICD-10-CM

## 2022-12-31 DIAGNOSIS — K92.2 UGI BLEED: Primary | ICD-10-CM

## 2022-12-31 DIAGNOSIS — R42 EPISODIC LIGHTHEADEDNESS: ICD-10-CM

## 2022-12-31 LAB
#MXD IC: 0.7 X10ˆ3/UL (ref 0.1–1)
ALBUMIN SERPL-MCNC: 3.1 G/DL (ref 3.4–5)
ALBUMIN/GLOB SERPL: 1.3 {RATIO} (ref 1–2)
ALP LIVER SERPL-CCNC: 63 U/L
ALT SERPL-CCNC: 38 U/L
ANION GAP SERPL CALC-SCNC: 2 MMOL/L (ref 0–18)
ANTIBODY SCREEN: NEGATIVE
AST SERPL-CCNC: 19 U/L (ref 15–37)
BASOPHILS # BLD AUTO: 0.01 X10(3) UL (ref 0–0.2)
BASOPHILS NFR BLD AUTO: 0.2 %
BILIRUB SERPL-MCNC: 0.4 MG/DL (ref 0.1–2)
BUN BLD-MCNC: 20 MG/DL (ref 7–18)
BUN BLD-MCNC: 21 MG/DL (ref 7–18)
BUN BLD-MCNC: 29 MG/DL (ref 7–18)
CALCIUM BLD-MCNC: 8 MG/DL (ref 8.5–10.1)
CHLORIDE BLD-SCNC: 107 MMOL/L (ref 98–112)
CHLORIDE BLD-SCNC: 107 MMOL/L (ref 98–112)
CHLORIDE SERPL-SCNC: 109 MMOL/L (ref 98–112)
CO2 BLD-SCNC: 26 MMOL/L (ref 21–32)
CO2 BLD-SCNC: 26 MMOL/L (ref 21–32)
CO2 SERPL-SCNC: 27 MMOL/L (ref 21–32)
CREAT BLD-MCNC: 0.8 MG/DL
CREAT BLD-MCNC: 0.8 MG/DL
CREAT BLD-MCNC: 0.87 MG/DL
EOSINOPHIL # BLD AUTO: 0.05 X10(3) UL (ref 0–0.7)
EOSINOPHIL NFR BLD AUTO: 1 %
ERYTHROCYTE [DISTWIDTH] IN BLOOD BY AUTOMATED COUNT: 13.7 %
GFR SERPLBLD BASED ON 1.73 SQ M-ARVRAT: 91 ML/MIN/1.73M2 (ref 60–?)
GFR SERPLBLD BASED ON 1.73 SQ M-ARVRAT: 93 ML/MIN/1.73M2 (ref 60–?)
GFR SERPLBLD BASED ON 1.73 SQ M-ARVRAT: 93 ML/MIN/1.73M2 (ref 60–?)
GLOBULIN PLAS-MCNC: 2.4 G/DL (ref 2.8–4.4)
GLUCOSE BLD-MCNC: 85 MG/DL (ref 70–99)
GLUCOSE BLD-MCNC: 91 MG/DL (ref 70–99)
GLUCOSE BLD-MCNC: 97 MG/DL (ref 70–99)
HCT VFR BLD AUTO: 31.5 %
HCT VFR BLD AUTO: 36.1 %
HCT VFR BLD CALC: 33 %
HCT VFR BLD CALC: 36 %
HGB BLD-MCNC: 10.5 G/DL
HGB BLD-MCNC: 11.4 G/DL
IMM GRANULOCYTES # BLD AUTO: 0.02 X10(3) UL (ref 0–1)
IMM GRANULOCYTES NFR BLD: 0.4 %
ISTAT IONIZED CALCIUM FOR CHEM 8: 0.92 MMOL/L (ref 1.12–1.32)
ISTAT IONIZED CALCIUM FOR CHEM 8: 1.2 MMOL/L (ref 1.12–1.32)
LYMPHOCYTES # BLD AUTO: 2.38 X10(3) UL (ref 1–4)
LYMPHOCYTES # BLD AUTO: 3 X10ˆ3/UL (ref 1–4)
LYMPHOCYTES NFR BLD AUTO: 45.4 %
LYMPHOCYTES NFR BLD AUTO: 45.9 %
MCH RBC QN AUTO: 31.1 PG (ref 26–34)
MCH RBC QN AUTO: 33 PG (ref 26–34)
MCHC RBC AUTO-ENTMCNC: 31.6 G/DL (ref 31–37)
MCHC RBC AUTO-ENTMCNC: 33.3 G/DL (ref 31–37)
MCV RBC AUTO: 98.6 FL (ref 80–100)
MCV RBC AUTO: 99.1 FL
MIXED CELL %: 10.1 %
MONOCYTES # BLD AUTO: 0.49 X10(3) UL (ref 0.1–1)
MONOCYTES NFR BLD AUTO: 9.4 %
NEUTROPHILS # BLD AUTO: 2.24 X10 (3) UL (ref 1.5–7.7)
NEUTROPHILS # BLD AUTO: 2.24 X10(3) UL (ref 1.5–7.7)
NEUTROPHILS # BLD AUTO: 2.8 X10ˆ3/UL (ref 1.5–7.7)
NEUTROPHILS NFR BLD AUTO: 43.1 %
NEUTROPHILS NFR BLD AUTO: 44.5 %
OSMOLALITY SERPL CALC.SUM OF ELEC: 289 MOSM/KG (ref 275–295)
PLATELET # BLD AUTO: 188 10(3)UL (ref 150–450)
PLATELET # BLD AUTO: 247 X10ˆ3/UL (ref 150–450)
POTASSIUM BLD-SCNC: 5.4 MMOL/L (ref 3.6–5.1)
POTASSIUM BLD-SCNC: 6 MMOL/L (ref 3.6–5.1)
POTASSIUM SERPL-SCNC: 3.7 MMOL/L (ref 3.5–5.1)
PROT SERPL-MCNC: 5.5 G/DL (ref 6.4–8.2)
RBC # BLD AUTO: 3.18 X10(6)UL
RBC # BLD AUTO: 3.66 X10ˆ6/UL
RH BLOOD TYPE: POSITIVE
SARS-COV-2 RNA RESP QL NAA+PROBE: NOT DETECTED
SODIUM BLD-SCNC: 135 MMOL/L (ref 136–145)
SODIUM BLD-SCNC: 140 MMOL/L (ref 136–145)
SODIUM SERPL-SCNC: 138 MMOL/L (ref 136–145)
WBC # BLD AUTO: 5.2 X10(3) UL (ref 4–11)
WBC # BLD AUTO: 6.5 X10ˆ3/UL (ref 4–11)

## 2022-12-31 PROCEDURE — 99223 1ST HOSP IP/OBS HIGH 75: CPT | Performed by: HOSPITALIST

## 2022-12-31 PROCEDURE — 85025 COMPLETE CBC W/AUTO DIFF WBC: CPT | Performed by: PHYSICIAN ASSISTANT

## 2022-12-31 PROCEDURE — 74177 CT ABD & PELVIS W/CONTRAST: CPT | Performed by: EMERGENCY MEDICINE

## 2022-12-31 PROCEDURE — 80047 BASIC METABLC PNL IONIZED CA: CPT | Performed by: PHYSICIAN ASSISTANT

## 2022-12-31 PROCEDURE — 82272 OCCULT BLD FECES 1-3 TESTS: CPT | Performed by: PHYSICIAN ASSISTANT

## 2022-12-31 PROCEDURE — 93000 ELECTROCARDIOGRAM COMPLETE: CPT | Performed by: PHYSICIAN ASSISTANT

## 2022-12-31 PROCEDURE — 99215 OFFICE O/P EST HI 40 MIN: CPT | Performed by: PHYSICIAN ASSISTANT

## 2022-12-31 RX ORDER — MELATONIN
3 NIGHTLY PRN
Status: DISCONTINUED | OUTPATIENT
Start: 2022-12-31 | End: 2023-01-01

## 2022-12-31 RX ORDER — SODIUM PHOSPHATE, DIBASIC AND SODIUM PHOSPHATE, MONOBASIC 7; 19 G/133ML; G/133ML
1 ENEMA RECTAL ONCE AS NEEDED
Status: DISCONTINUED | OUTPATIENT
Start: 2022-12-31 | End: 2023-01-01

## 2022-12-31 RX ORDER — SODIUM CHLORIDE 9 MG/ML
1000 INJECTION, SOLUTION INTRAVENOUS ONCE
Status: COMPLETED | OUTPATIENT
Start: 2022-12-31 | End: 2022-12-31

## 2022-12-31 RX ORDER — ONDANSETRON 2 MG/ML
4 INJECTION INTRAMUSCULAR; INTRAVENOUS EVERY 6 HOURS PRN
Status: DISCONTINUED | OUTPATIENT
Start: 2022-12-31 | End: 2023-01-01

## 2022-12-31 RX ORDER — DEXTROSE, SODIUM CHLORIDE, SODIUM LACTATE, POTASSIUM CHLORIDE, AND CALCIUM CHLORIDE 5; .6; .31; .03; .02 G/100ML; G/100ML; G/100ML; G/100ML; G/100ML
100 INJECTION, SOLUTION INTRAVENOUS CONTINUOUS
Status: DISCONTINUED | OUTPATIENT
Start: 2022-12-31 | End: 2023-01-01

## 2022-12-31 RX ORDER — METOCLOPRAMIDE HYDROCHLORIDE 5 MG/ML
10 INJECTION INTRAMUSCULAR; INTRAVENOUS EVERY 8 HOURS PRN
Status: DISCONTINUED | OUTPATIENT
Start: 2022-12-31 | End: 2023-01-01

## 2022-12-31 RX ORDER — ECHINACEA PURPUREA EXTRACT 125 MG
1 TABLET ORAL
Status: DISCONTINUED | OUTPATIENT
Start: 2022-12-31 | End: 2023-01-01

## 2022-12-31 RX ORDER — POLYETHYLENE GLYCOL 3350 17 G/17G
17 POWDER, FOR SOLUTION ORAL DAILY PRN
Status: DISCONTINUED | OUTPATIENT
Start: 2022-12-31 | End: 2023-01-01

## 2022-12-31 RX ORDER — SENNOSIDES 8.6 MG
17.2 TABLET ORAL NIGHTLY PRN
Status: DISCONTINUED | OUTPATIENT
Start: 2022-12-31 | End: 2023-01-01

## 2022-12-31 RX ORDER — ACETAMINOPHEN 500 MG
1000 TABLET ORAL EVERY 4 HOURS PRN
Status: DISCONTINUED | OUTPATIENT
Start: 2022-12-31 | End: 2023-01-01

## 2022-12-31 RX ORDER — BISACODYL 10 MG
10 SUPPOSITORY, RECTAL RECTAL
Status: DISCONTINUED | OUTPATIENT
Start: 2022-12-31 | End: 2023-01-01

## 2022-12-31 RX ORDER — DEXTROSE AND SODIUM CHLORIDE 5; .45 G/100ML; G/100ML
INJECTION, SOLUTION INTRAVENOUS CONTINUOUS
Status: CANCELLED | OUTPATIENT
Start: 2022-12-31 | End: 2022-12-31

## 2022-12-31 RX ORDER — ONDANSETRON 2 MG/ML
4 INJECTION INTRAMUSCULAR; INTRAVENOUS EVERY 6 HOURS PRN
Status: DISCONTINUED | OUTPATIENT
Start: 2022-12-31 | End: 2022-12-31

## 2022-12-31 NOTE — DISCHARGE INSTRUCTIONS
Please return to the ER/clinic if symptoms worsen. Follow-up with your PCP in 24-48 hours as needed. Go directly to [facility] emergency room. Do not make any stops. If anything changes along the way pull over and dial 911.

## 2022-12-31 NOTE — ED INITIAL ASSESSMENT (HPI)
Patient reports c/o black tarry stools since Monday, went to Urgent Care today and was told he has high potassium and needed to go to ER. Patient denies pain. Patient is on daily Aspirin but denies anti-coagulant.

## 2023-01-01 ENCOUNTER — ANESTHESIA EVENT (OUTPATIENT)
Dept: ENDOSCOPY | Facility: HOSPITAL | Age: 73
End: 2023-01-01
Payer: MEDICARE

## 2023-01-01 ENCOUNTER — ANESTHESIA (OUTPATIENT)
Dept: ENDOSCOPY | Facility: HOSPITAL | Age: 73
End: 2023-01-01
Payer: MEDICARE

## 2023-01-01 VITALS
HEART RATE: 69 BPM | RESPIRATION RATE: 18 BRPM | BODY MASS INDEX: 28 KG/M2 | DIASTOLIC BLOOD PRESSURE: 74 MMHG | SYSTOLIC BLOOD PRESSURE: 124 MMHG | TEMPERATURE: 99 F | WEIGHT: 201 LBS | OXYGEN SATURATION: 100 %

## 2023-01-01 LAB
ANION GAP SERPL CALC-SCNC: 3 MMOL/L (ref 0–18)
BASOPHILS # BLD AUTO: 0.01 X10(3) UL (ref 0–0.2)
BASOPHILS NFR BLD AUTO: 0.2 %
BUN BLD-MCNC: 13 MG/DL (ref 7–18)
CALCIUM BLD-MCNC: 8.1 MG/DL (ref 8.5–10.1)
CHLORIDE SERPL-SCNC: 111 MMOL/L (ref 98–112)
CO2 SERPL-SCNC: 28 MMOL/L (ref 21–32)
CREAT BLD-MCNC: 0.86 MG/DL
EOSINOPHIL # BLD AUTO: 0.05 X10(3) UL (ref 0–0.7)
EOSINOPHIL NFR BLD AUTO: 1.2 %
ERYTHROCYTE [DISTWIDTH] IN BLOOD BY AUTOMATED COUNT: 13.7 %
GFR SERPLBLD BASED ON 1.73 SQ M-ARVRAT: 91 ML/MIN/1.73M2 (ref 60–?)
GLUCOSE BLD-MCNC: 110 MG/DL (ref 70–99)
HCT VFR BLD AUTO: 31.2 %
HGB BLD-MCNC: 10.2 G/DL
IMM GRANULOCYTES # BLD AUTO: 0.02 X10(3) UL (ref 0–1)
IMM GRANULOCYTES NFR BLD: 0.5 %
LYMPHOCYTES # BLD AUTO: 2.01 X10(3) UL (ref 1–4)
LYMPHOCYTES NFR BLD AUTO: 50.1 %
MAGNESIUM SERPL-MCNC: 2 MG/DL (ref 1.6–2.6)
MCH RBC QN AUTO: 32.7 PG (ref 26–34)
MCHC RBC AUTO-ENTMCNC: 32.7 G/DL (ref 31–37)
MCV RBC AUTO: 100 FL
MONOCYTES # BLD AUTO: 0.54 X10(3) UL (ref 0.1–1)
MONOCYTES NFR BLD AUTO: 13.5 %
NEUTROPHILS # BLD AUTO: 1.38 X10 (3) UL (ref 1.5–7.7)
NEUTROPHILS # BLD AUTO: 1.38 X10(3) UL (ref 1.5–7.7)
NEUTROPHILS NFR BLD AUTO: 34.5 %
OSMOLALITY SERPL CALC.SUM OF ELEC: 295 MOSM/KG (ref 275–295)
PLATELET # BLD AUTO: 195 10(3)UL (ref 150–450)
POTASSIUM SERPL-SCNC: 3.9 MMOL/L (ref 3.5–5.1)
RBC # BLD AUTO: 3.12 X10(6)UL
SODIUM SERPL-SCNC: 142 MMOL/L (ref 136–145)
WBC # BLD AUTO: 4 X10(3) UL (ref 4–11)

## 2023-01-01 PROCEDURE — 99239 HOSP IP/OBS DSCHRG MGMT >30: CPT | Performed by: HOSPITALIST

## 2023-01-01 PROCEDURE — 0DJ08ZZ INSPECTION OF UPPER INTESTINAL TRACT, VIA NATURAL OR ARTIFICIAL OPENING ENDOSCOPIC: ICD-10-PCS | Performed by: INTERNAL MEDICINE

## 2023-01-01 RX ORDER — LIDOCAINE HYDROCHLORIDE 10 MG/ML
INJECTION, SOLUTION EPIDURAL; INFILTRATION; INTRACAUDAL; PERINEURAL AS NEEDED
Status: DISCONTINUED | OUTPATIENT
Start: 2023-01-01 | End: 2023-01-01 | Stop reason: SURG

## 2023-01-01 RX ORDER — PANTOPRAZOLE SODIUM 40 MG/1
40 TABLET, DELAYED RELEASE ORAL
Status: DISCONTINUED | OUTPATIENT
Start: 2023-01-01 | End: 2023-01-01

## 2023-01-01 RX ORDER — NALOXONE HYDROCHLORIDE 0.4 MG/ML
80 INJECTION, SOLUTION INTRAMUSCULAR; INTRAVENOUS; SUBCUTANEOUS AS NEEDED
Status: DISCONTINUED | OUTPATIENT
Start: 2023-01-01 | End: 2023-01-01 | Stop reason: HOSPADM

## 2023-01-01 RX ORDER — HYDROMORPHONE HYDROCHLORIDE 1 MG/ML
0.4 INJECTION, SOLUTION INTRAMUSCULAR; INTRAVENOUS; SUBCUTANEOUS EVERY 5 MIN PRN
Status: DISCONTINUED | OUTPATIENT
Start: 2023-01-01 | End: 2023-01-01 | Stop reason: HOSPADM

## 2023-01-01 RX ORDER — SODIUM CHLORIDE, SODIUM LACTATE, POTASSIUM CHLORIDE, CALCIUM CHLORIDE 600; 310; 30; 20 MG/100ML; MG/100ML; MG/100ML; MG/100ML
INJECTION, SOLUTION INTRAVENOUS CONTINUOUS
Status: DISCONTINUED | OUTPATIENT
Start: 2023-01-01 | End: 2023-01-01

## 2023-01-01 RX ORDER — SODIUM CHLORIDE 9 MG/ML
INJECTION, SOLUTION INTRAVENOUS CONTINUOUS PRN
Status: DISCONTINUED | OUTPATIENT
Start: 2023-01-01 | End: 2023-01-01 | Stop reason: SURG

## 2023-01-01 RX ORDER — DIPHENHYDRAMINE HYDROCHLORIDE 50 MG/ML
12.5 INJECTION INTRAMUSCULAR; INTRAVENOUS AS NEEDED
Status: DISCONTINUED | OUTPATIENT
Start: 2023-01-01 | End: 2023-01-01 | Stop reason: HOSPADM

## 2023-01-01 RX ORDER — METOCLOPRAMIDE HYDROCHLORIDE 5 MG/ML
10 INJECTION INTRAMUSCULAR; INTRAVENOUS AS NEEDED
Status: DISCONTINUED | OUTPATIENT
Start: 2023-01-01 | End: 2023-01-01 | Stop reason: HOSPADM

## 2023-01-01 RX ORDER — ONDANSETRON 2 MG/ML
4 INJECTION INTRAMUSCULAR; INTRAVENOUS AS NEEDED
Status: DISCONTINUED | OUTPATIENT
Start: 2023-01-01 | End: 2023-01-01 | Stop reason: HOSPADM

## 2023-01-01 RX ORDER — PANTOPRAZOLE SODIUM 40 MG/1
40 TABLET, DELAYED RELEASE ORAL
Qty: 120 TABLET | Refills: 0 | Status: SHIPPED | OUTPATIENT
Start: 2023-01-01

## 2023-01-01 RX ADMIN — LIDOCAINE HYDROCHLORIDE 50 MG: 10 INJECTION, SOLUTION EPIDURAL; INFILTRATION; INTRACAUDAL; PERINEURAL at 12:41:00

## 2023-01-01 RX ADMIN — SODIUM CHLORIDE: 9 INJECTION, SOLUTION INTRAVENOUS at 12:49:00

## 2023-01-01 RX ADMIN — SODIUM CHLORIDE: 9 INJECTION, SOLUTION INTRAVENOUS at 12:39:00

## 2023-01-01 NOTE — ED QUICK NOTES
Orders for admission, patient is aware of plan and ready to go upstairs. Any questions, please call ED RN Berta Sabillon  at extension [phone]. Vaccinated?  yes  Type of COVID test sent:rapid  COVID Suspicion level: Low/High  low    Titratable drug(s) infusing:  Rate:  Normal saline  LOC at time of transport:  aox4  Other pertinent information:    CIWA score=  NIH score=

## 2023-01-01 NOTE — OPERATIVE REPORT
Operative Report-Esophagogastroduodenoscopy      PREOPERATIVE DIAGNOSIS/INDICATION: Melena    POSTOPERTATIVE DIAGNOSIS:     PROCEDURE PERFORMED: EGD    INFORMED CONSENT: Once a brief history and physical examination was performed, the risks, benefits and alternatives to the procedure were discussed with the patient and/or family and informed consent was obtained. The risks of sedation, perforation, missed lesions and need for surgery were all discussed. Patient expressed understanding of the risks and agreed to proceed. PROCEDURE DESCRIPTION:  The patient was then brought to the endoscopy suite where his/her pulse, pulse oximetry and blood pressure were monitored. He/she was placed in the left lateral decubitus position and deep sedation was administered. Once adequate sedation was achieved, a bite block was placed and a lubricated tip of an Olympus video upper endoscope was inserted through the oropharynx and gently manipulated through the esophagus into the stomach and the distal duodenum. Upon withdrawal of the endoscope, careful visualization of the mucosa was performed. FINDINGS:    - ESOPHAGUS: Normal esophagus. Z-line at 40 cm.    - STOMACH: Moderate gastritis of the entire examined stomach. Large 8 mm clean-based antral ulcer with no active bleeding or high risk stigmata such as visible vessel. Normal retroflexion of the stomach.    - DUODENUM: Mild duodenitis of the bulb. No active bleeding seen in this examination. THERAPEUTICS: None    RECOMMENDATIONS:     - Post EGD precautions, watch for bleeding, infection, perforation, adverse drug reactions     - H. Pylori stool Ag    - Pantoprazole 40 mg twice daily    - Repeat EGD in 8 weeks. - OV in 4 weeks     GI will sign off at this time. Please call back with any questions or concerns.        Abbie Daniels MD  1/1/2023  12:49 PM

## 2023-01-01 NOTE — PROGRESS NOTES
Patient seen and examined. Medically clear to discharge today. Gen: NAD  Resp: CTA  CVS: s1s2  Abd: soft, +bowel sounds  Neck: trachea is midline      UGIB: EGD with ulcer. No active bleeding. PPI BID x 8 weeks  Hx of TIA: resume ASA as op. To discuss with his OP treatment team regarding decreasing his dose  Anxiety/depression  HLD    35 minutes spent on discharge planning.       Jimbo Victor MD

## 2023-01-01 NOTE — ANESTHESIA POSTPROCEDURE EVALUATION
[facility] Patient Status:  Inpatient   Age/Gender 67year old male MRN [id number]   Location [address] [address] Attending Yeyo Gil MD   [address] Day # 1 PCP Luis Maldonado MD       Anesthesia Post-op Note    ESOPHAGOGASTRODUODENOSCOPY (EGD)    Procedure Summary     Date: 01/01/23 Room / Location: [facility] ENDOSCOPY 02 / 1404 [facility] ENDOSCOPY    Anesthesia Start: 6846 Anesthesia Stop:     Procedure: ESOPHAGOGASTRODUODENOSCOPY (EGD) Diagnosis:       GI bleed      (gastric ulcer, gastritis, duodenitis)    Surgeons: Hazel Momin MD Anesthesiologist: Niraj Cooney MD    Anesthesia Type: MAC ASA Status: 3          Anesthesia Type: MAC    Vitals Value Taken Time   /84 01/01/23 1249   Temp  01/01/23 1249   Pulse 72 01/01/23 1249   Resp 16 01/01/23 1249   SpO2 100 01/01/23 1249       Patient Location: Endoscopy    Anesthesia Type: MAC    Airway Patency: patent    Postop Pain Control: adequate    Mental Status: mildly sedated but able to meaningfully participate in the post-anesthesia evaluation    Nausea/Vomiting: none    Cardiopulmonary/Hydration status: stable euvolemic    Complications: no apparent anesthesia related complications    Postop vital signs: stable    Dental Exam: Unchanged from Preop    Patient to be discharged from PACU when criteria met.

## 2023-01-01 NOTE — DISCHARGE INSTRUCTIONS
Resume ASA on 1/3  Discuss with your doctor about possibly decreasing your ASA to 81mg  PPI BID x 8 weeks  F/u with GI in 4 weeks    If you develop any bloody/black stools, call your doctor or return to emergency room.

## 2023-01-01 NOTE — PLAN OF CARE
Problem: Patient/Family Goals  Goal: Patient/Family Long Term Goal  Description: Patient's Long Term Goal: Go home    Interventions:  - Overnight monitoring   - Scope   - See additional Care Plan goals for specific interventions  Outcome: Progressing  Goal: Patient/Family Short Term Goal  Description: Patient's Short Term Goal:   No pain  Safety     Interventions:   - hourly rounding  -fall precautions   - See additional Care Plan goals for specific interventions  Outcome: Progressing     Problem: SAFETY ADULT - FALL  Goal: Free from fall injury  Description: INTERVENTIONS:  - Assess pt frequently for physical needs  - Identify cognitive and physical deficits and behaviors that affect risk of falls.   - [place] fall precautions as indicated by assessment.  - Educate pt/family on patient safety including physical limitations  - Instruct pt to call for assistance with activity based on assessment  - Modify environment to reduce risk of injury  - Provide assistive devices as appropriate  - Consider OT/PT consult to assist with strengthening/mobility  - Encourage toileting schedule  Outcome: Progressing     Problem: DISCHARGE PLANNING  Goal: Discharge to home or other facility with appropriate resources  Description: INTERVENTIONS:  - Identify barriers to discharge w/pt and caregiver  - Include patient/family/discharge partner in discharge planning  - Arrange for needed discharge resources and transportation as appropriate  - Identify discharge learning needs (meds, wound care, etc)  - Arrange for interpreters to assist at discharge as needed  - Consider post-discharge preferences of patient/family/discharge partner  - Complete POLST form as appropriate  - Assess patient's ability to be responsible for managing their own health  - Refer to Case Management Department for coordinating discharge planning if the patient needs post-hospital services based on physician/LIP order or complex needs related to functional status, cognitive ability or social support system  Outcome: Progressing     Problem: METABOLIC/FLUID AND ELECTROLYTES - ADULT  Goal: Hemodynamic stability and optimal renal function maintained  Description: INTERVENTIONS:  - Monitor labs and assess for signs and symptoms of volume excess or deficit  - Monitor intake, output and patient weight  - Monitor urine specific gravity, serum osmolarity and serum sodium as indicated or ordered  - Monitor response to interventions for patient's volume status, including labs, urine output, blood pressure (other measures as available)  - Encourage oral intake as appropriate  - Instruct patient on fluid and nutrition restrictions as appropriate  Outcome: Progressing

## 2023-01-01 NOTE — ED QUICK NOTES
Pt awake and alert, skin w/d,resps reg/unlabored. Pt appears comfortable and in no distress. Report given to [facility], RN.

## 2023-01-01 NOTE — PLAN OF CARE
Problem: Patient/Family Goals  Goal: Patient/Family Long Term Goal  Description: Patient's Long Term Goal: Go home    Interventions:  - Overnight monitoring   - Scope   - See additional Care Plan goals for specific interventions  Outcome: Progressing  Goal: Patient/Family Short Term Goal  Description: Patient's Short Term Goal:   No pain  Safety     Interventions:   - hourly rounding  -fall precautions   - See additional Care Plan goals for specific interventions  Outcome: Progressing     Problem: SAFETY ADULT - FALL  Goal: Free from fall injury  Description: INTERVENTIONS:  - Assess pt frequently for physical needs  - Identify cognitive and physical deficits and behaviors that affect risk of falls.   - [place] fall precautions as indicated by assessment.  - Educate pt/family on patient safety including physical limitations  - Instruct pt to call for assistance with activity based on assessment  - Modify environment to reduce risk of injury  - Provide assistive devices as appropriate  - Consider OT/PT consult to assist with strengthening/mobility  - Encourage toileting schedule  Outcome: Progressing     Problem: DISCHARGE PLANNING  Goal: Discharge to home or other facility with appropriate resources  Description: INTERVENTIONS:  - Identify barriers to discharge w/pt and caregiver  - Include patient/family/discharge partner in discharge planning  - Arrange for needed discharge resources and transportation as appropriate  - Identify discharge learning needs (meds, wound care, etc)  - Arrange for interpreters to assist at discharge as needed  - Consider post-discharge preferences of patient/family/discharge partner  - Complete POLST form as appropriate  - Assess patient's ability to be responsible for managing their own health  - Refer to Case Management Department for coordinating discharge planning if the patient needs post-hospital services based on physician/LIP order or complex needs related to functional status, cognitive ability or social support system  Outcome: Progressing     Problem: METABOLIC/FLUID AND ELECTROLYTES - ADULT  Goal: Hemodynamic stability and optimal renal function maintained  Description: INTERVENTIONS:  - Monitor labs and assess for signs and symptoms of volume excess or deficit  - Monitor intake, output and patient weight  - Monitor urine specific gravity, serum osmolarity and serum sodium as indicated or ordered  - Monitor response to interventions for patient's volume status, including labs, urine output, blood pressure (other measures as available)  - Encourage oral intake as appropriate  - Instruct patient on fluid and nutrition restrictions as appropriate  Outcome: Progressing   Pt is alert and oriented x4. VSS. Maintaining o2 sats on r/a. SCD's. Pt voiding freely. No BM this shift. No c/o pain at this time. IVF as ordered. Plan for hemoglobin redraw in AM.  Pt to remain NPO after midnight for a probable scope. Pt updated w/ poc. Will continue to monitor.

## 2023-01-02 LAB
ATRIAL RATE: 54 BPM
ATRIAL RATE: 59 BPM
P AXIS: 34 DEGREES
P AXIS: 49 DEGREES
P-R INTERVAL: 146 MS
P-R INTERVAL: 150 MS
POCT HEMOCCULT: POSITIVE
Q-T INTERVAL: 428 MS
Q-T INTERVAL: 448 MS
QRS DURATION: 86 MS
QRS DURATION: 88 MS
QTC CALCULATION (BEZET): 423 MS
QTC CALCULATION (BEZET): 424 MS
R AXIS: 12 DEGREES
R AXIS: 4 DEGREES
T AXIS: 22 DEGREES
T AXIS: 28 DEGREES
VENTRICULAR RATE: 54 BPM
VENTRICULAR RATE: 59 BPM

## 2023-01-06 LAB — H PYLORI AG STL QL IA: NEGATIVE

## 2023-01-09 ENCOUNTER — OFFICE VISIT (OUTPATIENT)
Dept: SURGERY | Facility: CLINIC | Age: 73
End: 2023-01-09
Payer: MEDICARE

## 2023-01-09 DIAGNOSIS — N13.8 BPH WITH OBSTRUCTION/LOWER URINARY TRACT SYMPTOMS: ICD-10-CM

## 2023-01-09 DIAGNOSIS — N20.0 NEPHROLITHIASIS: ICD-10-CM

## 2023-01-09 DIAGNOSIS — R82.90 URINE FINDING: Primary | ICD-10-CM

## 2023-01-09 DIAGNOSIS — N40.1 BPH WITH OBSTRUCTION/LOWER URINARY TRACT SYMPTOMS: ICD-10-CM

## 2023-01-09 LAB
APPEARANCE: CLEAR
BILIRUBIN: NEGATIVE
GLUCOSE (URINE DIPSTICK): NEGATIVE MG/DL
KETONES (URINE DIPSTICK): NEGATIVE MG/DL
LEUKOCYTES: NEGATIVE
MULTISTIX LOT#: NORMAL NUMERIC
NITRITE, URINE: NEGATIVE
OCCULT BLOOD: NEGATIVE
PH, URINE: 6 (ref 4.5–8)
PROTEIN (URINE DIPSTICK): NEGATIVE MG/DL
SPECIFIC GRAVITY: >=1.03 (ref 1–1.03)
URINE-COLOR: YELLOW
UROBILINOGEN,SEMI-QN: 0.2 MG/DL (ref 0–1.9)

## 2023-01-09 PROCEDURE — 1111F DSCHRG MED/CURRENT MED MERGE: CPT | Performed by: SURGERY

## 2023-01-09 PROCEDURE — 99214 OFFICE O/P EST MOD 30 MIN: CPT | Performed by: SURGERY

## 2023-01-09 PROCEDURE — 81003 URINALYSIS AUTO W/O SCOPE: CPT | Performed by: SURGERY

## 2023-01-09 RX ORDER — FINASTERIDE 5 MG/1
5 TABLET, FILM COATED ORAL DAILY
Qty: 90 TABLET | Refills: 6 | Status: SHIPPED | OUTPATIENT
Start: 2023-01-09

## 2023-01-09 RX ORDER — TAMSULOSIN HYDROCHLORIDE 0.4 MG/1
0.4 CAPSULE ORAL EVERY EVENING
Qty: 90 CAPSULE | Refills: 6 | Status: SHIPPED | OUTPATIENT
Start: 2023-01-09

## 2023-02-01 PROBLEM — N40.1 BENIGN PROSTATIC HYPERPLASIA WITH NOCTURIA: Status: ACTIVE | Noted: 2022-02-11

## 2023-02-01 PROBLEM — E78.41 ELEVATED LIPOPROTEIN(A): Status: ACTIVE | Noted: 2023-02-01

## 2023-02-01 PROBLEM — D48.5 NEOPLASM OF UNCERTAIN BEHAVIOR OF SKIN: Status: ACTIVE | Noted: 2022-02-15

## 2023-02-01 PROBLEM — R35.1 BENIGN PROSTATIC HYPERPLASIA WITH NOCTURIA: Status: ACTIVE | Noted: 2022-02-11

## 2023-02-01 PROBLEM — F41.8 DEPRESSION WITH ANXIETY: Status: ACTIVE | Noted: 2023-02-01

## 2023-02-01 PROBLEM — I73.00 RAYNAUD'S PHENOMENON WITHOUT GANGRENE: Status: ACTIVE | Noted: 2022-02-11

## 2023-02-01 PROBLEM — R73.01 IMPAIRED FASTING GLUCOSE: Status: ACTIVE | Noted: 2023-02-01

## 2023-02-03 ENCOUNTER — TELEPHONE (OUTPATIENT)
Dept: SURGERY | Facility: CLINIC | Age: 73
End: 2023-02-03

## 2023-02-03 ENCOUNTER — PROCEDURE (OUTPATIENT)
Dept: SURGERY | Facility: CLINIC | Age: 73
End: 2023-02-03

## 2023-02-03 VITALS — DIASTOLIC BLOOD PRESSURE: 71 MMHG | HEART RATE: 67 BPM | SYSTOLIC BLOOD PRESSURE: 114 MMHG | TEMPERATURE: 97 F

## 2023-02-03 DIAGNOSIS — R31.0 HEMATURIA, GROSS: Primary | ICD-10-CM

## 2023-02-03 PROCEDURE — 52000 CYSTOURETHROSCOPY: CPT | Performed by: SURGERY

## 2023-02-03 RX ORDER — CIPROFLOXACIN 500 MG/1
500 TABLET, FILM COATED ORAL ONCE
Status: COMPLETED | OUTPATIENT
Start: 2023-02-03 | End: 2023-02-03

## 2023-02-03 RX ADMIN — CIPROFLOXACIN 500 MG: 500 TABLET, FILM COATED ORAL at 15:27:00

## 2023-02-03 NOTE — TELEPHONE ENCOUNTER
Hi,    Can you please schedule this patient for surgery. Also, can you arrange for the patient to drop a urine sample for urine culture 1-2 weeks prior to the scheduled surgery date? I placed the order. Thanks,  Bartholome Litten     Urology Surgery Request  Surgeon: Comfort Cantu  Location (if known): [facility]  Procedure:  Cystoscopy , bladder stone extraction  Anesthesia: General   Time Frame:  Second half of March  Time required:  30 minutes  Diagnosis:  Bladder stone  Special Equipment: None    Antibiotics: per hospital protocol unless checked below   ___ Levaquin 500 mg IV   ___ Gemcitabine 2 g/100 mL NS bladder instillation to be given in OR    Estimated Post Op/Follow Up Appt:   2 months for follow-up with uroflow/PVR. Please schedule appointment at time of surgery scheduling.

## 2023-02-06 DIAGNOSIS — N21.0 BLADDER STONE: Primary | ICD-10-CM

## 2023-02-08 ENCOUNTER — TELEPHONE (OUTPATIENT)
Dept: SURGERY | Facility: CLINIC | Age: 73
End: 2023-02-08

## 2023-02-08 NOTE — TELEPHONE ENCOUNTER
LM on pts VM to schedule 2 month post op appt with Dr. Марина Reno post 3/29/23 CYSTOSCOPY, BLADDER STONE EXTRACTION

## 2023-03-03 ENCOUNTER — LAB ENCOUNTER (OUTPATIENT)
Dept: LAB | Facility: HOSPITAL | Age: 73
End: 2023-03-03
Attending: INTERNAL MEDICINE
Payer: MEDICARE

## 2023-03-03 DIAGNOSIS — Z01.818 PRE-OP TESTING: ICD-10-CM

## 2023-03-04 LAB — SARS-COV-2 RNA RESP QL NAA+PROBE: NOT DETECTED

## 2023-03-06 PROBLEM — K25.0 ACUTE GASTRIC ULCER WITH HEMORRHAGE: Status: ACTIVE | Noted: 2023-03-06

## 2023-03-06 PROBLEM — D50.9 IRON DEFICIENCY ANEMIA, UNSPECIFIED: Status: ACTIVE | Noted: 2023-03-06

## 2023-03-06 PROBLEM — K29.60 EROSIVE GASTRITIS: Status: ACTIVE | Noted: 2023-03-06

## 2023-03-08 ENCOUNTER — APPOINTMENT (OUTPATIENT)
Dept: CT IMAGING | Facility: HOSPITAL | Age: 73
End: 2023-03-08
Attending: EMERGENCY MEDICINE
Payer: MEDICARE

## 2023-03-08 ENCOUNTER — APPOINTMENT (OUTPATIENT)
Dept: GENERAL RADIOLOGY | Facility: HOSPITAL | Age: 73
End: 2023-03-08
Attending: EMERGENCY MEDICINE
Payer: MEDICARE

## 2023-03-08 ENCOUNTER — HOSPITAL ENCOUNTER (EMERGENCY)
Facility: HOSPITAL | Age: 73
Discharge: HOME | End: 2023-03-08
Attending: EMERGENCY MEDICINE
Payer: MEDICARE

## 2023-03-08 VITALS
HEART RATE: 85 BPM | SYSTOLIC BLOOD PRESSURE: 114 MMHG | TEMPERATURE: 98 F | WEIGHT: 200 LBS | BODY MASS INDEX: 28 KG/M2 | RESPIRATION RATE: 14 BRPM | OXYGEN SATURATION: 100 % | HEIGHT: 71 IN | DIASTOLIC BLOOD PRESSURE: 67 MMHG

## 2023-03-08 DIAGNOSIS — R73.9 HYPERGLYCEMIA: ICD-10-CM

## 2023-03-08 DIAGNOSIS — R55 SYNCOPE, VASOVAGAL: Primary | ICD-10-CM

## 2023-03-08 DIAGNOSIS — E87.6 HYPOKALEMIA: ICD-10-CM

## 2023-03-08 LAB
ALBUMIN SERPL-MCNC: 3.1 G/DL (ref 3.4–5)
ALBUMIN/GLOB SERPL: 1 {RATIO} (ref 1–2)
ALP LIVER SERPL-CCNC: 69 U/L
ALT SERPL-CCNC: 22 U/L
ANION GAP SERPL CALC-SCNC: 6 MMOL/L (ref 0–18)
AST SERPL-CCNC: 20 U/L (ref 15–37)
BASOPHILS # BLD AUTO: 0.01 X10(3) UL (ref 0–0.2)
BASOPHILS NFR BLD AUTO: 0.1 %
BILIRUB SERPL-MCNC: 0.5 MG/DL (ref 0.1–2)
BILIRUB UR QL STRIP.AUTO: NEGATIVE
BUN BLD-MCNC: 16 MG/DL (ref 7–18)
CALCIUM BLD-MCNC: 8.5 MG/DL (ref 8.5–10.1)
CHLORIDE SERPL-SCNC: 108 MMOL/L (ref 98–112)
CLARITY UR REFRACT.AUTO: CLEAR
CO2 SERPL-SCNC: 24 MMOL/L (ref 21–32)
COLOR UR AUTO: YELLOW
CREAT BLD-MCNC: 0.92 MG/DL
EOSINOPHIL # BLD AUTO: 0.01 X10(3) UL (ref 0–0.7)
EOSINOPHIL NFR BLD AUTO: 0.1 %
ERYTHROCYTE [DISTWIDTH] IN BLOOD BY AUTOMATED COUNT: 14.3 %
GFR SERPLBLD BASED ON 1.73 SQ M-ARVRAT: 88 ML/MIN/1.73M2 (ref 60–?)
GLOBULIN PLAS-MCNC: 3.2 G/DL (ref 2.8–4.4)
GLUCOSE BLD-MCNC: 166 MG/DL (ref 70–99)
GLUCOSE UR STRIP.AUTO-MCNC: NEGATIVE MG/DL
HCT VFR BLD AUTO: 34.7 %
HGB BLD-MCNC: 10.9 G/DL
IMM GRANULOCYTES # BLD AUTO: 0.04 X10(3) UL (ref 0–1)
IMM GRANULOCYTES NFR BLD: 0.4 %
LEUKOCYTE ESTERASE UR QL STRIP.AUTO: NEGATIVE
LYMPHOCYTES # BLD AUTO: 1.13 X10(3) UL (ref 1–4)
LYMPHOCYTES NFR BLD AUTO: 10.7 %
MAGNESIUM SERPL-MCNC: 1.9 MG/DL (ref 1.6–2.6)
MCH RBC QN AUTO: 28.2 PG (ref 26–34)
MCHC RBC AUTO-ENTMCNC: 31.4 G/DL (ref 31–37)
MCV RBC AUTO: 89.7 FL
MONOCYTES # BLD AUTO: 0.66 X10(3) UL (ref 0.1–1)
MONOCYTES NFR BLD AUTO: 6.3 %
NEUTROPHILS # BLD AUTO: 8.7 X10 (3) UL (ref 1.5–7.7)
NEUTROPHILS # BLD AUTO: 8.7 X10(3) UL (ref 1.5–7.7)
NEUTROPHILS NFR BLD AUTO: 82.4 %
NITRITE UR QL STRIP.AUTO: NEGATIVE
NT-PROBNP SERPL-MCNC: 187 PG/ML (ref ?–125)
OSMOLALITY SERPL CALC.SUM OF ELEC: 291 MOSM/KG (ref 275–295)
PH UR STRIP.AUTO: 6 [PH] (ref 5–8)
PLATELET # BLD AUTO: 221 10(3)UL (ref 150–450)
POTASSIUM SERPL-SCNC: 3.3 MMOL/L (ref 3.5–5.1)
PROT SERPL-MCNC: 6.3 G/DL (ref 6.4–8.2)
PROT UR STRIP.AUTO-MCNC: NEGATIVE MG/DL
RBC # BLD AUTO: 3.87 X10(6)UL
RBC UR QL AUTO: NEGATIVE
SODIUM SERPL-SCNC: 138 MMOL/L (ref 136–145)
SP GR UR STRIP.AUTO: 1.02 (ref 1–1.03)
TROPONIN I HIGH SENSITIVITY: 12 NG/L
TROPONIN I HIGH SENSITIVITY: 14 NG/L
TSI SER-ACNC: 2.54 MIU/ML (ref 0.36–3.74)
UROBILINOGEN UR STRIP.AUTO-MCNC: <2 MG/DL
WBC # BLD AUTO: 10.6 X10(3) UL (ref 4–11)

## 2023-03-08 PROCEDURE — 80053 COMPREHEN METABOLIC PANEL: CPT | Performed by: EMERGENCY MEDICINE

## 2023-03-08 PROCEDURE — 81003 URINALYSIS AUTO W/O SCOPE: CPT | Performed by: EMERGENCY MEDICINE

## 2023-03-08 PROCEDURE — 83735 ASSAY OF MAGNESIUM: CPT | Performed by: EMERGENCY MEDICINE

## 2023-03-08 PROCEDURE — 84443 ASSAY THYROID STIM HORMONE: CPT | Performed by: EMERGENCY MEDICINE

## 2023-03-08 PROCEDURE — 96361 HYDRATE IV INFUSION ADD-ON: CPT

## 2023-03-08 PROCEDURE — 71045 X-RAY EXAM CHEST 1 VIEW: CPT | Performed by: EMERGENCY MEDICINE

## 2023-03-08 PROCEDURE — 93010 ELECTROCARDIOGRAM REPORT: CPT

## 2023-03-08 PROCEDURE — 85025 COMPLETE CBC W/AUTO DIFF WBC: CPT | Performed by: EMERGENCY MEDICINE

## 2023-03-08 PROCEDURE — 96360 HYDRATION IV INFUSION INIT: CPT

## 2023-03-08 PROCEDURE — 99285 EMERGENCY DEPT VISIT HI MDM: CPT

## 2023-03-08 PROCEDURE — 83880 ASSAY OF NATRIURETIC PEPTIDE: CPT | Performed by: EMERGENCY MEDICINE

## 2023-03-08 PROCEDURE — 84484 ASSAY OF TROPONIN QUANT: CPT | Performed by: EMERGENCY MEDICINE

## 2023-03-08 PROCEDURE — 93005 ELECTROCARDIOGRAM TRACING: CPT

## 2023-03-08 PROCEDURE — 70450 CT HEAD/BRAIN W/O DYE: CPT | Performed by: EMERGENCY MEDICINE

## 2023-03-08 RX ORDER — POTASSIUM CHLORIDE 20 MEQ/1
40 TABLET, EXTENDED RELEASE ORAL ONCE
Status: COMPLETED | OUTPATIENT
Start: 2023-03-08 | End: 2023-03-08

## 2023-03-08 NOTE — ED INITIAL ASSESSMENT (HPI)
PT ARRIVED VIA EMS, COMING FROM HOME WITH WITNESSED POSSIBLE SYNCOPAL EVENT. DENIES FALL. FEELS HE WAS UP TO BATHROOM MULTIPLE TIMES LAST NOC, MAY HAVE BEEN PUSHING TOO HARD. DENIES PAIN/INJURY.

## 2023-03-09 LAB
ATRIAL RATE: 84 BPM
ATRIAL RATE: 85 BPM
P AXIS: 46 DEGREES
P AXIS: 48 DEGREES
P-R INTERVAL: 134 MS
P-R INTERVAL: 138 MS
Q-T INTERVAL: 362 MS
Q-T INTERVAL: 372 MS
QRS DURATION: 86 MS
QRS DURATION: 86 MS
QTC CALCULATION (BEZET): 430 MS
QTC CALCULATION (BEZET): 439 MS
R AXIS: 10 DEGREES
R AXIS: 6 DEGREES
T AXIS: 17 DEGREES
T AXIS: 24 DEGREES
VENTRICULAR RATE: 84 BPM
VENTRICULAR RATE: 85 BPM

## 2023-03-15 ENCOUNTER — TELEPHONE (OUTPATIENT)
Dept: SURGERY | Facility: CLINIC | Age: 73
End: 2023-03-15

## 2023-03-15 NOTE — TELEPHONE ENCOUNTER
Pt called stating pt is scheduled for a procedure on 3-29-23. Does pt need to have a urine test done prior.   Please call

## 2023-03-18 ENCOUNTER — LABORATORY ENCOUNTER (OUTPATIENT)
Dept: LAB | Facility: HOSPITAL | Age: 73
End: 2023-03-18
Attending: SURGERY
Payer: MEDICARE

## 2023-03-18 DIAGNOSIS — N21.0 BLADDER STONE: ICD-10-CM

## 2023-03-18 PROCEDURE — 87086 URINE CULTURE/COLONY COUNT: CPT

## 2023-03-20 NOTE — PROGRESS NOTES
Sg Vaca,    I have reviewed your urine test results. Tests show no significant abnormalities (no signs of infection in the urine). No changes to the plan as we had previously discussed. Please let me know if you have any questions.     Thanks,  Josh Gordon MD

## 2023-03-27 ENCOUNTER — LAB ENCOUNTER (OUTPATIENT)
Dept: LAB | Age: 73
End: 2023-03-27
Attending: SURGERY
Payer: MEDICARE

## 2023-03-27 DIAGNOSIS — N21.0 BLADDER STONE: ICD-10-CM

## 2023-03-28 ENCOUNTER — ANESTHESIA EVENT (OUTPATIENT)
Dept: SURGERY | Facility: HOSPITAL | Age: 73
End: 2023-03-28
Payer: MEDICARE

## 2023-03-28 LAB — SARS-COV-2 RNA RESP QL NAA+PROBE: NOT DETECTED

## 2023-03-29 ENCOUNTER — ANESTHESIA (OUTPATIENT)
Dept: SURGERY | Facility: HOSPITAL | Age: 73
End: 2023-03-29
Payer: MEDICARE

## 2023-03-29 ENCOUNTER — HOSPITAL ENCOUNTER (OUTPATIENT)
Facility: HOSPITAL | Age: 73
Setting detail: HOSPITAL OUTPATIENT SURGERY
Discharge: HOME | End: 2023-03-29
Attending: SURGERY | Admitting: SURGERY
Payer: MEDICARE

## 2023-03-29 ENCOUNTER — APPOINTMENT (OUTPATIENT)
Dept: GENERAL RADIOLOGY | Facility: HOSPITAL | Age: 73
End: 2023-03-29
Attending: SURGERY
Payer: MEDICARE

## 2023-03-29 VITALS
SYSTOLIC BLOOD PRESSURE: 114 MMHG | WEIGHT: 203 LBS | DIASTOLIC BLOOD PRESSURE: 71 MMHG | HEIGHT: 71 IN | RESPIRATION RATE: 18 BRPM | BODY MASS INDEX: 28.42 KG/M2 | HEART RATE: 58 BPM | TEMPERATURE: 97 F | OXYGEN SATURATION: 98 %

## 2023-03-29 DIAGNOSIS — N21.0 BLADDER STONE: Primary | ICD-10-CM

## 2023-03-29 PROCEDURE — 0TCB8ZZ EXTIRPATION OF MATTER FROM BLADDER, VIA NATURAL OR ARTIFICIAL OPENING ENDOSCOPIC: ICD-10-PCS | Performed by: SURGERY

## 2023-03-29 PROCEDURE — 52317 REMOVE BLADDER STONE: CPT | Performed by: SURGERY

## 2023-03-29 RX ORDER — SULFAMETHOXAZOLE AND TRIMETHOPRIM 800; 160 MG/1; MG/1
1 TABLET ORAL 2 TIMES DAILY
Qty: 4 TABLET | Refills: 0 | Status: SHIPPED | OUTPATIENT
Start: 2023-03-29 | End: 2023-03-31

## 2023-03-29 RX ORDER — SODIUM CHLORIDE, SODIUM LACTATE, POTASSIUM CHLORIDE, CALCIUM CHLORIDE 600; 310; 30; 20 MG/100ML; MG/100ML; MG/100ML; MG/100ML
INJECTION, SOLUTION INTRAVENOUS CONTINUOUS
Status: DISCONTINUED | OUTPATIENT
Start: 2023-03-29 | End: 2023-03-29

## 2023-03-29 RX ORDER — HYDROMORPHONE HYDROCHLORIDE 1 MG/ML
0.6 INJECTION, SOLUTION INTRAMUSCULAR; INTRAVENOUS; SUBCUTANEOUS EVERY 5 MIN PRN
Status: DISCONTINUED | OUTPATIENT
Start: 2023-03-29 | End: 2023-03-29

## 2023-03-29 RX ORDER — ONDANSETRON 2 MG/ML
INJECTION INTRAMUSCULAR; INTRAVENOUS AS NEEDED
Status: DISCONTINUED | OUTPATIENT
Start: 2023-03-29 | End: 2023-03-29 | Stop reason: SURG

## 2023-03-29 RX ORDER — LIDOCAINE HYDROCHLORIDE 10 MG/ML
INJECTION, SOLUTION EPIDURAL; INFILTRATION; INTRACAUDAL; PERINEURAL AS NEEDED
Status: DISCONTINUED | OUTPATIENT
Start: 2023-03-29 | End: 2023-03-29 | Stop reason: SURG

## 2023-03-29 RX ORDER — HYDROCODONE BITARTRATE AND ACETAMINOPHEN 5; 325 MG/1; MG/1
2 TABLET ORAL ONCE AS NEEDED
Status: DISCONTINUED | OUTPATIENT
Start: 2023-03-29 | End: 2023-03-29

## 2023-03-29 RX ORDER — MEPERIDINE HYDROCHLORIDE 25 MG/ML
12.5 INJECTION INTRAMUSCULAR; INTRAVENOUS; SUBCUTANEOUS AS NEEDED
Status: DISCONTINUED | OUTPATIENT
Start: 2023-03-29 | End: 2023-03-29

## 2023-03-29 RX ORDER — EPHEDRINE SULFATE 50 MG/ML
INJECTION INTRAVENOUS AS NEEDED
Status: DISCONTINUED | OUTPATIENT
Start: 2023-03-29 | End: 2023-03-29 | Stop reason: SURG

## 2023-03-29 RX ORDER — ONDANSETRON 2 MG/ML
4 INJECTION INTRAMUSCULAR; INTRAVENOUS EVERY 6 HOURS PRN
Status: DISCONTINUED | OUTPATIENT
Start: 2023-03-29 | End: 2023-03-29

## 2023-03-29 RX ORDER — HYDROMORPHONE HYDROCHLORIDE 1 MG/ML
0.2 INJECTION, SOLUTION INTRAMUSCULAR; INTRAVENOUS; SUBCUTANEOUS EVERY 5 MIN PRN
Status: DISCONTINUED | OUTPATIENT
Start: 2023-03-29 | End: 2023-03-29

## 2023-03-29 RX ORDER — ACETAMINOPHEN 500 MG
1000 TABLET ORAL ONCE
Status: DISCONTINUED | OUTPATIENT
Start: 2023-03-29 | End: 2023-03-29 | Stop reason: HOSPADM

## 2023-03-29 RX ORDER — NALOXONE HYDROCHLORIDE 0.4 MG/ML
80 INJECTION, SOLUTION INTRAMUSCULAR; INTRAVENOUS; SUBCUTANEOUS AS NEEDED
Status: DISCONTINUED | OUTPATIENT
Start: 2023-03-29 | End: 2023-03-29

## 2023-03-29 RX ORDER — ACETAMINOPHEN 500 MG
1000 TABLET ORAL ONCE AS NEEDED
Status: DISCONTINUED | OUTPATIENT
Start: 2023-03-29 | End: 2023-03-29

## 2023-03-29 RX ORDER — DEXAMETHASONE SODIUM PHOSPHATE 4 MG/ML
VIAL (ML) INJECTION AS NEEDED
Status: DISCONTINUED | OUTPATIENT
Start: 2023-03-29 | End: 2023-03-29 | Stop reason: SURG

## 2023-03-29 RX ORDER — METOCLOPRAMIDE HYDROCHLORIDE 5 MG/ML
10 INJECTION INTRAMUSCULAR; INTRAVENOUS EVERY 8 HOURS PRN
Status: DISCONTINUED | OUTPATIENT
Start: 2023-03-29 | End: 2023-03-29

## 2023-03-29 RX ORDER — HYDROMORPHONE HYDROCHLORIDE 1 MG/ML
0.4 INJECTION, SOLUTION INTRAMUSCULAR; INTRAVENOUS; SUBCUTANEOUS EVERY 5 MIN PRN
Status: DISCONTINUED | OUTPATIENT
Start: 2023-03-29 | End: 2023-03-29

## 2023-03-29 RX ORDER — CEFAZOLIN SODIUM/WATER 2 G/20 ML
2 SYRINGE (ML) INTRAVENOUS ONCE
Status: COMPLETED | OUTPATIENT
Start: 2023-03-29 | End: 2023-03-29

## 2023-03-29 RX ORDER — HYDROCODONE BITARTRATE AND ACETAMINOPHEN 5; 325 MG/1; MG/1
1 TABLET ORAL ONCE AS NEEDED
Status: DISCONTINUED | OUTPATIENT
Start: 2023-03-29 | End: 2023-03-29

## 2023-03-29 RX ADMIN — LIDOCAINE HYDROCHLORIDE 50 MG: 10 INJECTION, SOLUTION EPIDURAL; INFILTRATION; INTRACAUDAL; PERINEURAL at 08:49:00

## 2023-03-29 RX ADMIN — EPHEDRINE SULFATE 10 MG: 50 INJECTION INTRAVENOUS at 08:59:00

## 2023-03-29 RX ADMIN — DEXAMETHASONE SODIUM PHOSPHATE 4 MG: 4 MG/ML VIAL (ML) INJECTION at 09:00:00

## 2023-03-29 RX ADMIN — EPHEDRINE SULFATE 15 MG: 50 INJECTION INTRAVENOUS at 09:04:00

## 2023-03-29 RX ADMIN — ONDANSETRON 4 MG: 2 INJECTION INTRAMUSCULAR; INTRAVENOUS at 09:00:00

## 2023-03-29 RX ADMIN — CEFAZOLIN SODIUM/WATER 2 G: 2 G/20 ML SYRINGE (ML) INTRAVENOUS at 08:51:00

## 2023-03-29 RX ADMIN — SODIUM CHLORIDE, SODIUM LACTATE, POTASSIUM CHLORIDE, CALCIUM CHLORIDE: 600; 310; 30; 20 INJECTION, SOLUTION INTRAVENOUS at 08:42:00

## 2023-03-29 RX ADMIN — SODIUM CHLORIDE, SODIUM LACTATE, POTASSIUM CHLORIDE, CALCIUM CHLORIDE: 600; 310; 30; 20 INJECTION, SOLUTION INTRAVENOUS at 09:16:00

## 2023-03-29 NOTE — DISCHARGE INSTRUCTIONS
You had a cystoscopy and bladder stone removel in the operating room. Instructions:    - No heavy lifting or strenuous activity for 1 day. You may resume regular activity tomorrow. - Your follow-up appointment is listed below. Please contact us at  if you need to change your appointment. Future Appointments   Date Time Provider Erika Bishop   5/25/2023 11:00 AM Alicia Vinson MD [facility] Nap 4     - You may experience mild pain after the procedure for a few days. If the pain becomes intolerable please contact our office or go to the nearest Emergency Room or Urgent Care. You should take over the counter ibuprofen (AKA motrin, advil) for mild pain (provided you do not have a medical condition such as stomach ulcers or kidney disease which prohibits you from taking these). You may alternate this with tylenol as well. If pain is still not relieved by tylenol and/or ibuprofen, you may take narcotic pain medication if prescribed (typically oxycodone or tramadol). If you are taking narcotic pain medication this can make you constipated, so you should take over the counter stool softeners or miralax if prescribed. - Warm pack or hot baths often help with discomfort after cystoscopy. - If you take blood thinners (such as aspirin or plavix) please hold these medications until 3 days after surgery.     - You may experience burning and frequency of urination over the next few days. This will improve after a few days if you stay well hydrated. If you were prescribed phenazopyridine (Pyridium) this may relieve urinary discomfort but you can only take this for 3 days. Pyridium will make your urine orange. - You are likely to see some blood in your urine (pink or light red urine) that should clear up within a few days. Staying well hydrated should help this clear up.  If you notice the urine stays dark red or there are multiple large blood clots despite good hydration, please call the urology [facility] ([phone]). - Try to abstain from alcohol, coffee, tea, artificial sweeteners, and spicy food for the next 48 hours as these can irritate the bladder.     - If you develop fevers / chills, difficulty urinating, or abdominal pain please call the office.     - Drink 1.5 to 2 liters of fluid today (water is preferable). If you are on a fluid restriction due to other medical reasons then you need to adhere to your fluid restriction recommendations. Dustin Dunn.  Unique Escudero MD  Staff Urologist  [place] [address]  Office: [phone]

## 2023-03-29 NOTE — ANESTHESIA POSTPROCEDURE EVALUATION
[facility] Patient Status:  Hospital Outpatient Surgery   Age/Gender 67year old male MRN [id number]   [facility] SURGERY Attending Génesis De Paz MD   Hosp Day # 0 PCP Reida Siemens, MD       Anesthesia Post-op Note    CYSTOSCOPY, BLADDER STONE EXTRACTION    Procedure Summary     Date: 03/29/23 Room / Location: [address] [place] [address] / [address] [place] [address] OR    Anesthesia Start: 4265 Anesthesia Stop: 1532    Procedure: CYSTOSCOPY, BLADDER STONE EXTRACTION (Bladder) Diagnosis:       Bladder stone      (Bladder stone [N21.0])    Surgeons: Génesis De Paz MD Anesthesiologist: Seamus Diaz MD    Anesthesia Type: general ASA Status: 3          Anesthesia Type: general    Vitals Value Taken Time   /69 03/29/23 0925   Temp 98.2 03/29/23 0925   Pulse 76 03/29/23 0925   Resp 18 03/29/23 0925   SpO2 100% 03/29/23 0925       Patient Location: PACU    Anesthesia Type: general    Airway Patency: patent    Postop Pain Control: adequate    Mental Status: mildly sedated but able to meaningfully participate in the post-anesthesia evaluation    Nausea/Vomiting: none    Cardiopulmonary/Hydration status: stable euvolemic    Complications: no apparent anesthesia related complications    Postop vital signs: stable    Dental Exam: Unchanged from Preop    Patient to be discharged from PACU when criteria met.

## 2023-03-29 NOTE — ANESTHESIA PROCEDURE NOTES
Airway  Date/Time: 3/29/2023 8:50 AM  Urgency: elective      General Information and Staff    Patient location during procedure: OR  Anesthesiologist: Yariel Nunez MD  Resident/CRNA: Enma Leblanc CRNA  Performed: CRNA   Performed by: Enma Leblanc CRNA  Authorized by: Yariel Nunez MD      Indications and Patient Condition  Indications for airway management: anesthesia  Sedation level: deep  Preoxygenated: yes  Patient position: sniffing  Mask difficulty assessment: 0 - not attempted    Final Airway Details  Final airway type: supraglottic airway      Successful airway: classic  Size 4       Number of attempts at approach: 1

## 2023-03-29 NOTE — OPERATIVE REPORT
Urology Operative Note    Attending Surgeon: Harshad Ferrell MD    Assistant Surgeon: None    Patient Name: Megan Dela Cruz    Date of Surgery: 3/29/2023    Preoperative Diagnosis: Bladder stone    Postoperative Diagnosis: Same    Procedure Performed: Cystolitholapaxy (<2.5 cm)    Indication:  Patient is a 67year old male with a 0.9 cm bladder stone discovered on imaging. His voiding is improving after addition of finasteride to his medications, so he elected not to proceed with concomitant TURP despite enlarged prostate with intravesical protrusion on cystoscopy in clinic. The patient was counseled on options, risks, and benefits and elected to undergo the above procedure. We discussed risks including, but not limited to, bleeding, infection, damage to surrounding structures, need for repeat procedures, or urinary retention. The patient understood these risks and wished to proceed with surgery. Findings:  Bladder stone 0.9 cm. Broken to pieces and fully extracted from bladder. Cystoscopy showed trabeculated bladder and enlarged obstructive prostate with coapting lateral lobes and intravesical large median lobe. Procedure: The patient was taken to the operating room and a timeout was performed confirming the correct patient and procedure. The patient was prepped and draped in lithotomy position after undergoing general anesthesia. Pre-operative prophylactic antibiotics were given in the form of Cefazolin. The 25 Czech rigid cystoscope was inserted per urethra. The bladder appeared moderately trabeculated and there was a single free-floating stone as previously seen on cystoscopy in clinic. The prostate was enlarged with trilobar hyperplasia including coapting lateral lobes and intravesical protrusion of the median lobe. I attempted to use the grasper through the 25 [place] scope to grab the stone and remove it, but it was slightly too large to do this after a few attempts.     I then inserted the 26 German resectoscope per urethra. I used the stone  to break the stone into a few smaller pieces and then irrigated the stone pieces out of the bladder and sent them for specimen. There were no further stone pieces within the bladder at the end of the procedure and hemostasis was excellent. I removed the scope after draining the bladder. The patient was awoken from anesthesia and transferred to PACU in stable condition. The patient tolerated the procedure well. All instrument/supply counts were correct at the end of the case. Specimens:   Bladder stone fragments    Estimated Blood Loss:  1 mL    Tubes/Drains:  None    Complications:   None immediate    Condition from OR:  Stable    Plan:   Return to clinic in 2-3 months for evaluation of obstructive lower urinary tract symptoms and PVR bladder scan. dEison Qiu.  Justus Quintero MD  Staff Urologist  [facility]  Office: [phone]

## 2023-04-02 LAB — CALCULI MASS: 249 MG

## 2023-04-03 NOTE — PROGRESS NOTES
Lauren Alonzo,    I have reviewed your stone analysis results. Your bladder stone was made of calcium, which is the most common type of stone. Please let me know if you have any questions.     Thanks,  Stephany Tamayo MD

## 2023-05-16 ENCOUNTER — OFFICE VISIT (OUTPATIENT)
Dept: SURGERY | Facility: CLINIC | Age: 73
End: 2023-05-16

## 2023-05-16 DIAGNOSIS — R33.9 INCOMPLETE EMPTYING OF BLADDER: ICD-10-CM

## 2023-05-16 DIAGNOSIS — N40.1 BPH WITH OBSTRUCTION/LOWER URINARY TRACT SYMPTOMS: Primary | ICD-10-CM

## 2023-05-16 DIAGNOSIS — N13.8 BPH WITH OBSTRUCTION/LOWER URINARY TRACT SYMPTOMS: Primary | ICD-10-CM

## 2023-05-16 DIAGNOSIS — N21.0 BLADDER STONE: ICD-10-CM

## 2023-05-16 LAB
APPEARANCE: CLEAR
BILIRUBIN: NEGATIVE
GLUCOSE (URINE DIPSTICK): NEGATIVE MG/DL
KETONES (URINE DIPSTICK): NEGATIVE MG/DL
LEUKOCYTES: NEGATIVE
MULTISTIX LOT#: NORMAL NUMERIC
NITRITE, URINE: NEGATIVE
OCCULT BLOOD: NEGATIVE
PH, URINE: 6.5 (ref 4.5–8)
PROTEIN (URINE DIPSTICK): NEGATIVE MG/DL
SPECIFIC GRAVITY: 1.01 (ref 1–1.03)
URINE-COLOR: YELLOW
UROBILINOGEN,SEMI-QN: 0.2 MG/DL (ref 0–1.9)

## 2023-05-16 PROCEDURE — 51798 US URINE CAPACITY MEASURE: CPT | Performed by: SURGERY

## 2023-05-16 PROCEDURE — 81003 URINALYSIS AUTO W/O SCOPE: CPT | Performed by: PHYSICIAN ASSISTANT

## 2023-05-16 PROCEDURE — 99213 OFFICE O/P EST LOW 20 MIN: CPT | Performed by: PHYSICIAN ASSISTANT

## 2023-05-16 RX ORDER — ATORVASTATIN CALCIUM 80 MG/1
TABLET, FILM COATED ORAL
COMMUNITY
Start: 2023-04-20

## 2023-05-16 RX ORDER — CLONAZEPAM 0.5 MG/1
TABLET ORAL
COMMUNITY
Start: 2023-04-15

## 2023-07-25 ENCOUNTER — TELEPHONE (OUTPATIENT)
Dept: SURGERY | Facility: CLINIC | Age: 73
End: 2023-07-25

## 2023-08-18 ENCOUNTER — OFFICE VISIT (OUTPATIENT)
Dept: SURGERY | Facility: CLINIC | Age: 73
End: 2023-08-18

## 2023-08-18 DIAGNOSIS — N13.8 BPH WITH OBSTRUCTION/LOWER URINARY TRACT SYMPTOMS: Primary | ICD-10-CM

## 2023-08-18 DIAGNOSIS — N40.1 BPH WITH OBSTRUCTION/LOWER URINARY TRACT SYMPTOMS: Primary | ICD-10-CM

## 2023-08-18 LAB
APPEARANCE: CLEAR
BILIRUBIN: NEGATIVE
GLUCOSE (URINE DIPSTICK): NEGATIVE MG/DL
KETONES (URINE DIPSTICK): NEGATIVE MG/DL
LEUKOCYTES: NEGATIVE
MULTISTIX LOT#: ABNORMAL NUMERIC
NITRITE, URINE: NEGATIVE
PH, URINE: 5.5 (ref 4.5–8)
PROTEIN (URINE DIPSTICK): NEGATIVE MG/DL
SPECIFIC GRAVITY: 1.02 (ref 1–1.03)
URINE-COLOR: YELLOW
UROBILINOGEN,SEMI-QN: 0.2 MG/DL (ref 0–1.9)

## 2023-08-18 PROCEDURE — 51798 US URINE CAPACITY MEASURE: CPT | Performed by: PHYSICIAN ASSISTANT

## 2023-08-18 PROCEDURE — 81003 URINALYSIS AUTO W/O SCOPE: CPT | Performed by: PHYSICIAN ASSISTANT

## 2023-08-18 PROCEDURE — 99213 OFFICE O/P EST LOW 20 MIN: CPT | Performed by: PHYSICIAN ASSISTANT

## 2023-08-28 ENCOUNTER — PATIENT MESSAGE (OUTPATIENT)
Dept: SURGERY | Facility: CLINIC | Age: 73
End: 2023-08-28

## 2023-09-13 ENCOUNTER — HOSPITAL ENCOUNTER (OUTPATIENT)
Dept: MRI IMAGING | Facility: HOSPITAL | Age: 73
Discharge: HOME | End: 2023-09-13
Attending: INTERNAL MEDICINE
Payer: MEDICARE

## 2023-09-13 DIAGNOSIS — D50.9 IRON DEFICIENCY ANEMIA, UNSPECIFIED IRON DEFICIENCY ANEMIA TYPE: ICD-10-CM

## 2023-09-13 PROCEDURE — 72197 MRI PELVIS W/O & W/DYE: CPT | Performed by: INTERNAL MEDICINE

## 2023-09-13 PROCEDURE — 74183 MRI ABD W/O CNTR FLWD CNTR: CPT | Performed by: INTERNAL MEDICINE

## 2023-09-13 PROCEDURE — A9575 INJ GADOTERATE MEGLUMI 0.1ML: HCPCS | Performed by: INTERNAL MEDICINE

## 2023-09-13 RX ORDER — GADOTERATE MEGLUMINE 376.9 MG/ML
20 INJECTION INTRAVENOUS
Status: COMPLETED | OUTPATIENT
Start: 2023-09-13 | End: 2023-09-13

## 2023-09-13 RX ADMIN — GADOTERATE MEGLUMINE 20 ML: 376.9 INJECTION INTRAVENOUS at 10:50:00

## 2023-11-07 PROBLEM — K86.2 PANCREATIC CYST (HCC): Status: ACTIVE | Noted: 2023-11-03

## 2023-11-07 PROBLEM — K86.2 PANCREATIC CYST: Status: ACTIVE | Noted: 2023-11-03

## 2023-11-07 PROBLEM — D62 ACUTE BLOOD LOSS ANEMIA: Status: ACTIVE | Noted: 2023-11-03

## 2023-11-07 PROBLEM — R55 SYNCOPE: Status: ACTIVE | Noted: 2023-11-07

## 2023-11-30 ENCOUNTER — PATIENT MESSAGE (OUTPATIENT)
Dept: SURGERY | Facility: CLINIC | Age: 73
End: 2023-11-30

## 2023-12-01 RX ORDER — TAMSULOSIN HYDROCHLORIDE 0.4 MG/1
0.8 CAPSULE ORAL EVERY EVENING
Qty: 180 CAPSULE | Refills: 2 | Status: SHIPPED | OUTPATIENT
Start: 2023-12-01

## 2023-12-01 NOTE — TELEPHONE ENCOUNTER
LOV on 8/18/23 with PA-C  Benign Prostatic Hypertrophy Medications      Protocol Criteria:  Appointment scheduled in the past 12 months or in the next 2 months  If patients is between the ages of 48 and 79 then PSA done within the last 2 years and is either  Less than or equal to 4.0 ng/ml  Or if greater than 4.0 there is no significant increase (>0.5 ng/ml) in PSA over the last 2 years    Recent Outpatient Visits              3 weeks ago Iron deficiency anemia, unspecified iron deficiency anemia type    [facility] Josette Salas APRN    Office Visit    3 months ago BPH with obstruction/lower urinary tract symptoms    [facility] [address] [facility] Afshan Petmichell., PA-C    Office Visit    5 months ago Iron deficiency anemia, unspecified iron deficiency anemia type    [facility],  Sarah Mcconnell MD    Nurse Only    6 months ago BPH with obstruction/lower urinary tract symptoms    [facility] [address] Afshan Petite., PA-C    Office Visit    10 months ago Multiple gastric ulcers    [facility] Josette Salas, APRN    Office Visit                  PSA:  No results found for: \"PSA\"    PSA Screen:  No results found for: \"PSAS\"

## 2024-01-23 ENCOUNTER — HOSPITAL ENCOUNTER (EMERGENCY)
Facility: HOSPITAL | Age: 74
Discharge: HOME | End: 2024-01-23
Attending: EMERGENCY MEDICINE
Payer: MEDICARE

## 2024-01-23 ENCOUNTER — APPOINTMENT (OUTPATIENT)
Dept: GENERAL RADIOLOGY | Facility: HOSPITAL | Age: 74
End: 2024-01-23
Attending: EMERGENCY MEDICINE
Payer: MEDICARE

## 2024-01-23 VITALS
TEMPERATURE: 98 F | SYSTOLIC BLOOD PRESSURE: 125 MMHG | HEIGHT: 71 IN | RESPIRATION RATE: 21 BRPM | WEIGHT: 205 LBS | HEART RATE: 58 BPM | OXYGEN SATURATION: 100 % | BODY MASS INDEX: 28.7 KG/M2 | DIASTOLIC BLOOD PRESSURE: 80 MMHG

## 2024-01-23 DIAGNOSIS — T07.XXXA MULTIPLE CONTUSIONS: ICD-10-CM

## 2024-01-23 DIAGNOSIS — S46.911A STRAIN OF RIGHT SHOULDER, INITIAL ENCOUNTER: ICD-10-CM
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
## 2024-01-23 LAB
ALBUMIN SERPL-MCNC: 3.4 G/DL (ref 3.4–5)
ALBUMIN/GLOB SERPL: 1.3 {RATIO} (ref 1–2)
ALP LIVER SERPL-CCNC: 67 U/L
ANION GAP SERPL CALC-SCNC: 1 MMOL/L (ref 0–18)
AST SERPL-CCNC: 31 U/L (ref 15–37)
BASOPHILS # BLD AUTO: 0.02 X10(3) UL (ref 0–0.2)
BASOPHILS NFR BLD AUTO: 0.4 %
BILIRUB SERPL-MCNC: 0.5 MG/DL (ref 0.1–2)
BILIRUB UR QL STRIP.AUTO: NEGATIVE
BUN BLD-MCNC: 19 MG/DL (ref 9–23)
CALCIUM BLD-MCNC: 8.3 MG/DL (ref 8.5–10.1)
CHLORIDE SERPL-SCNC: 115 MMOL/L (ref 98–112)
CLARITY UR REFRACT.AUTO: CLEAR
CO2 SERPL-SCNC: 26 MMOL/L (ref 21–32)
CREAT BLD-MCNC: 0.9 MG/DL
EGFRCR SERPLBLD CKD-EPI 2021: 90 ML/MIN/1.73M2 (ref 60–?)
EOSINOPHIL # BLD AUTO: 0.16 X10(3) UL (ref 0–0.7)
EOSINOPHIL NFR BLD AUTO: 3.3 %
ERYTHROCYTE [DISTWIDTH] IN BLOOD BY AUTOMATED COUNT: 12.8 %
GLOBULIN PLAS-MCNC: 2.6 G/DL (ref 2.8–4.4)
GLUCOSE BLD-MCNC: 115 MG/DL (ref 70–99)
GLUCOSE UR STRIP.AUTO-MCNC: NORMAL MG/DL
HCT VFR BLD AUTO: 42.7 %
HGB BLD-MCNC: 14.4 G/DL
IMM GRANULOCYTES # BLD AUTO: 0.01 X10(3) UL (ref 0–1)
IMM GRANULOCYTES NFR BLD: 0.2 %
KETONES UR STRIP.AUTO-MCNC: NEGATIVE MG/DL
LEUKOCYTE ESTERASE UR QL STRIP.AUTO: NEGATIVE
LYMPHOCYTES # BLD AUTO: 1.77 X10(3) UL (ref 1–4)
LYMPHOCYTES NFR BLD AUTO: 36.4 %
MCH RBC QN AUTO: 33.3 PG (ref 26–34)
MCHC RBC AUTO-ENTMCNC: 33.7 G/DL (ref 31–37)
MCV RBC AUTO: 98.6 FL
MONOCYTES # BLD AUTO: 0.42 X10(3) UL (ref 0.1–1)
MONOCYTES NFR BLD AUTO: 8.6 %
NEUTROPHILS # BLD AUTO: 2.48 X10 (3) UL (ref 1.5–7.7)
NEUTROPHILS # BLD AUTO: 2.48 X10(3) UL (ref 1.5–7.7)
NEUTROPHILS NFR BLD AUTO: 51.1 %
NITRITE UR QL STRIP.AUTO: NEGATIVE
OSMOLALITY SERPL CALC.SUM OF ELEC: 297 MOSM/KG (ref 275–295)
PH UR STRIP.AUTO: 6.5 [PH] (ref 5–8)
PLATELET # BLD AUTO: 156 10(3)UL (ref 150–450)
POTASSIUM SERPL-SCNC: 4.1 MMOL/L (ref 3.5–5.1)
PROT SERPL-MCNC: 6 G/DL (ref 6.4–8.2)
PROT UR STRIP.AUTO-MCNC: NEGATIVE MG/DL
RBC # BLD AUTO: 4.33 X10(6)UL
RBC UR QL AUTO: NEGATIVE
SODIUM SERPL-SCNC: 142 MMOL/L (ref 136–145)
SP GR UR STRIP.AUTO: 1.02 (ref 1–1.03)
UROBILINOGEN UR STRIP.AUTO-MCNC: NORMAL MG/DL
WBC # BLD AUTO: 4.9 X10(3) UL (ref 4–11)

## 2024-01-23 PROCEDURE — 85025 COMPLETE CBC W/AUTO DIFF WBC: CPT | Performed by: EMERGENCY MEDICINE

## 2024-01-23 PROCEDURE — 71045 X-RAY EXAM CHEST 1 VIEW: CPT | Performed by: EMERGENCY MEDICINE

## 2024-01-23 PROCEDURE — 99284 EMERGENCY DEPT VISIT MOD MDM: CPT

## 2024-01-23 PROCEDURE — 73030 X-RAY EXAM OF SHOULDER: CPT | Performed by: EMERGENCY MEDICINE

## 2024-01-23 PROCEDURE — 96360 HYDRATION IV INFUSION INIT: CPT

## 2024-01-23 PROCEDURE — 81003 URINALYSIS AUTO W/O SCOPE: CPT | Performed by: EMERGENCY MEDICINE

## 2024-01-23 PROCEDURE — 96361 HYDRATE IV INFUSION ADD-ON: CPT

## 2024-01-23 PROCEDURE — 80053 COMPREHEN METABOLIC PANEL: CPT | Performed by: EMERGENCY MEDICINE

## 2024-01-23 PROCEDURE — 99285 EMERGENCY DEPT VISIT HI MDM: CPT

## 2024-01-23 NOTE — ED QUICK NOTES
Ambulated patient without assistance. Pt denies dizziness/weakness, feels okay for discharge. ER MD made aware.

## 2024-01-23 NOTE — ED PROVIDER NOTES
Patient Seen in: [facility] Emergency Department      History     Chief Complaint   Patient presents with    Fall     Stated Complaint: fall    Subjective:   HPI    Patient presents after 2 falls.  The patient states that last night he slipped outside on the porch.  He got too close to the edge and it was slippery.  He states his feet went out from under him and he landed hurting his right shoulder.  He denies any head injury.  He was able to get himself up and come back inside.  He did not tell his wife.  This morning when he got up he tried to get up slow.  He states he has had what sounds like vasovagal syncope in the past.  He got into the bathroom and then just fell.  He states he did not feel dizzy prior to the fall.  He states his legs just gave out and he landed on the floor.  His wife heard him fall from the next room and came in.  He states that he does not believe he lost consciousness because she shouted to him right away and he answered.  He denies any head injury on the second fall as well.  He feels still some pain in his right shoulder and some soreness in his left groin.  He does not feel faint or dizzy.  He does feel like his mouth is dry.  He did not have any chest discomfort or palpitations.  He denies any black or bloody stools.    Objective:   Past Medical History:   Diagnosis Date    Anemia 12/31/2022    Anxiety state     BPH (benign prostatic hyperplasia)     Calculus of kidney 2017    Cancer (HCC) 1995    melanoma    Depression     Disorder of prostate     Enlarge    Frequent urination     High cholesterol     History of depression 02/24/2002    Hyperlipidemia     Leaking of urine     TIA (transient ischemic attack) 2010    Visual impairment     glasses    Wears glasses               Past Surgical History:   Procedure Laterality Date    COLONOSCOPY      x2    COLONOSCOPY  03/03/2023    HERNIA SURGERY      x2    OTHER      melanoma removal    TONSILLECTOMY      UPPER GI ENDOSCOPY,EXAM                   Social History     Socioeconomic History    Marital status:    Tobacco Use    Smoking status: Never    Smokeless tobacco: Never   Vaping Use    Vaping Use: Never used   Substance and Sexual Activity    Alcohol use: Yes     Alcohol/week: 3.0 - 5.0 standard drinks of alcohol     Types: 3 - 5 Cans of beer per week     Comment: occassional    Drug use: Never              Review of Systems    Positive for stated complaint: fall  Other systems are as noted in HPI.  Constitutional and vital signs reviewed.      All other systems reviewed and negative except as noted above.    Physical Exam     ED Triage Vitals [01/23/24 0923]   /76   Pulse 52   Resp 16   Temp 97.5 °F (36.4 °C)   Temp src Oral   SpO2 97 %   O2 Device None (Room air)       Current:/80   Pulse 58   Temp 97.5 °F (36.4 °C) (Oral)   Resp 21   Ht 180.3 cm (5' 11\")   Wt 93 kg   SpO2 100%   BMI 28.59 kg/m²         Physical Exam    General: Alert and oriented x3 in no acute distress.  HEENT: Normocephalic, atraumatic, pupils equal round and reactive to light, oropharynx clear, mucous membranes dry.  Neck: Supple no midline C-spine tenderness.  Cardiovascular: Regular rate and rhythm, no murmurs.  Respiratory: Lungs clear to auscultation.  No chest wall tenderness to palpation.  Back: No midline spinal tenderness to palpation.  Abdomen: Soft, nontender, no rebound or guarding, normal active bowel sounds, no CVA tenderness.  Extremities: No bony tenderness in the extremities, good range of motion of all extremities, no deformity, intact pulses in all extremities.  Skin: Early bruising to right flank and right forearm, no open wound.  Neurologic: Cranial nerves intact.  Strength 5/5 in all extremities.  Sensory exam grossly intact.    ED Course     Labs Reviewed   COMP METABOLIC PANEL (14) - Abnormal; Notable for the following components:       Result Value    Glucose 115 (*)     Chloride 115 (*)     Calcium, Total 8.3 (*)      Calculated Osmolality 297 (*)     Total Protein 6.0 (*)     Globulin  2.6 (*)     All other components within normal limits   CBC WITH DIFFERENTIAL WITH PLATELET    Narrative:     The following orders were created for panel order CBC With Differential With Platelet.  Procedure                               Abnormality         Status                     ---------                               -----------         ------                     CBC W/ DIFFERENTIAL[[id number]]                              Final result                 Please view results for these tests on the individual orders.   URINALYSIS, ROUTINE   CBC W/ DIFFERENTIAL   I personally reviewed the shoulder and chest films and no fracture noted.  XR SHOULDER, COMPLETE (MIN 2 VIEWS), RIGHT (CPT=73030)    Result Date: 1/23/2024  PROCEDURE:  XR SHOULDER, COMPLETE (MIN 2 VIEWS), RIGHT (CPT=73030)  TECHNIQUE:  Multiple views were obtained.  COMPARISON:  None.  INDICATIONS:  fall.     Pain involving the extremity, after injury.    PATIENT STATED HISTORY: (As transcribed by Technologist)  Patient fell on ice last night and then fell in the bathroom. He has pain in right shoulder.    FINDINGS:  Negative for fracture, dislocation, deformity or other acute bony abnormality. Osteoarthritic changes are noted, mild degree.  No soft tissue abnormalities.             CONCLUSION:  Mild osteoarthritic changes are present. No acute fracture or other acute bony process.   LOCATION:  [facility]   Dictated by (CST): Rishi Rivera MD on 1/23/2024 at 10:51 AM     Finalized by (CST): Rishi Rivera MD on 1/23/2024 at 10:51 AM       XR CHEST AP PORTABLE  (CPT=71045)    Result Date: 1/23/2024  PROCEDURE:  XR CHEST AP PORTABLE  (CPT=71045)  TECHNIQUE:  AP chest radiograph was obtained.  COMPARISON:  SAILAJA , YU, XR CHEST AP PORTABLE  (CPT=71045), 3/08/2023, 7:19 AM.  INDICATIONS:  fall  PATIENT STATED HISTORY: (As transcribed by Technologist)  Patient has no chest complaints.     FINDINGS:  Stable scarring left lung base.  No sign of consolidation.  Tortuous ectatic aorta present.  Cardiac shadow is enlarged..  The lungs are clear of active--appearing disease process.  The costophrenic angles are sharp.  There is no active disease seen.            CONCLUSION:  Tortuous ectatic aorta.  Cardiac shadow is enlarged.  No acute disease seen.   LOCATION:  [facility]      Dictated by (CST): Rishi Rivera MD on 1/23/2024 at 10:50 AM     Finalized by (CST): Rishi Rivera MD on 1/23/2024 at 10:51 AM            Medications   sodium chloride 0.9 % IV bolus 1,000 mL (0 mL Intravenous Stopped 1/23/24 1210)     The patient was started on a normal saline bolus given his dry mucous membranes and history of fall/weakness.  He remained in a sinus rhythm on the cardiac monitor.       MDM      Patient presents with repeat falls and shoulder pain.  Differential diagnosis includes but is not limited to fracture, arrhythmia, anemia and dehydration.  The patient has remained in a sinus rhythm.  He denies any dizziness or syncope and states that his legs just gave out on him.  The fall yesterday sounds to have been a mechanical fall.  He appears to have sustained a shoulder sprain but does not have any fracture.  He does not have evidence of anemia and his chemistry panel is overall reassuring.  He does not have a fever or evidence of urinary tract infection.  I suspect he may have just been more fatigued due to the injury yesterday and had not yet had anything to eat or drink today.  He was ambulatory in the emergency department after the fluids and did not feel faint or dizzy.  He is anxious to be discharged.  I feel comfortable with this plan given his negative evaluation.  If he has further falls or any syncope, he should return for repeat evaluation.                           Medical Decision Making      Disposition and Plan     Clinical Impression:  1. Fall, initial encounter    2. Multiple contusions    3.  Strain of right shoulder, initial encounter         Disposition:  Discharge  1/23/2024  1:54 pm    Follow-up:  Wei [facility] MD  [address] [id number] [phone]    Call in 1 day(s)            Medications Prescribed:  Discharge Medication List as of 1/23/2024  2:02 PM

## 2024-01-23 NOTE — ED INITIAL ASSESSMENT (HPI)
Pt to ED via EMS after falling last night around 2200 in his porch due to the ice, denies hitting head reports R shoulder pain. Pt reports had a fall this morning in the bathroom, pt reports \"his legs just gave out,\" denies feeling dizziness. Denies head injury. Reports it is only his R shoulder that hurts, no deformity noted, full ROM. No blood thinners. A&Ox4.

## 2024-03-15 PROBLEM — Z86.010 HISTORY OF ADENOMATOUS POLYP OF COLON: Status: ACTIVE | Noted: 2024-03-15

## 2024-03-15 PROBLEM — Z86.0101 HISTORY OF ADENOMATOUS POLYP OF COLON: Status: ACTIVE | Noted: 2024-03-15

## 2024-05-29 ENCOUNTER — OFFICE VISIT (OUTPATIENT)
Dept: SURGERY | Facility: CLINIC | Age: 74
End: 2024-05-29

## 2024-05-29 DIAGNOSIS — N40.1 BPH WITH OBSTRUCTION/LOWER URINARY TRACT SYMPTOMS: Primary | ICD-10-CM

## 2024-05-29 DIAGNOSIS — N13.8 BPH WITH OBSTRUCTION/LOWER URINARY TRACT SYMPTOMS: Primary | ICD-10-CM

## 2024-05-29 DIAGNOSIS — R82.90 URINE FINDING: ICD-10-CM

## 2024-05-29 LAB
APPEARANCE: CLEAR
BILIRUBIN: NEGATIVE
GLUCOSE (URINE DIPSTICK): NEGATIVE MG/DL
KETONES (URINE DIPSTICK): NEGATIVE MG/DL
LEUKOCYTES: NEGATIVE
MULTISTIX LOT#: NORMAL NUMERIC
NITRITE, URINE: NEGATIVE
OCCULT BLOOD: NEGATIVE
PH, URINE: 6 (ref 4.5–8)
PROTEIN (URINE DIPSTICK): NEGATIVE MG/DL
SPECIFIC GRAVITY: 1.02 (ref 1–1.03)
URINE-COLOR: YELLOW
UROBILINOGEN,SEMI-QN: 0.2 MG/DL (ref 0–1.9)

## 2024-05-29 PROCEDURE — 51798 US URINE CAPACITY MEASURE: CPT | Performed by: PHYSICIAN ASSISTANT

## 2024-05-29 PROCEDURE — 81003 URINALYSIS AUTO W/O SCOPE: CPT | Performed by: PHYSICIAN ASSISTANT

## 2024-05-29 PROCEDURE — 99212 OFFICE O/P EST SF 10 MIN: CPT | Performed by: PHYSICIAN ASSISTANT

## 2024-05-29 NOTE — PROGRESS NOTES
Subjective:   Dave Hughes is a 72 year old male with history of anxiety, melanoma, TIA, hyperlipidemia, bladder stones s/p cystolitholapaxy in 2020, nephrolithiasis (small bilateral nonobstructing renal calculi), and BPH/LUTS who presents today for follow up.    Symptoms are overall stable.   He noted improved urine stream with increased dosage of tamsulosin. No urge incontinence. Nocturia remains x 2-3. Otherwise remains generally satisfied with voiding habits.     Last seen in August 2023 for follow up. Recommended to continue medical mgmt with tamsulosin 0.8mg (increased to 0.8 in June '23) and finasteride 5mg daily. He was considering consultation with [facility] for HoLep procedure previously, but had deferred.     PVR 129mL, 69mL, and today 46mL.     He was seen originally in October 2022 for gross hematuria. CT scan showed small bilateral non obstructing stone and an 8mm solitary bladder stone, bladder distension with an approximately 90g prostate with significant intravesical protrusion.      He was started on finasteride and taken for treatment of bladder stone 3/29/23. Cystoscopy showed trabeculated bladder with enlarged obstructive prostate and coapting lateral lobes with intravesical large median lobe.     Gross hematuria: none recently  Tobacco history: none  Family history of urologic malignancy: none    History/Other:    Chief Complaint Reviewed and Verified  Nursing Notes Reviewed and   Verified  Allergies Reviewed  Medications Reviewed         Current Outpatient Medications   Medication Sig Dispense Refill    tamsulosin 0.4 MG Oral Cap Take 2 capsules (0.8 mg total) by mouth every evening. 180 capsule 2    atorvastatin 80 MG Oral Tab       clonazePAM 0.5 MG Oral Tab       Cholecalciferol 25 MCG (1000 UT) Oral Tab Take 25 mcg by mouth daily.      finasteride 5 MG Oral Tab Take 1 tablet (5 mg total) by mouth daily. 90 tablet 6    ezetimibe 10 MG Oral Tab Take 1 tablet (10 mg total) by mouth  nightly.      ZOLPIDEM TARTRATE OR Take 10 mg by mouth nightly.        Venlafaxine HCl (EFFEXOR XR OR) Take 225 mg by mouth daily.        Aspirin 81 MG Oral Cap Take 81 mg by mouth daily.      Ferrous Sulfate ER (SLOW FE) 142 (45 Fe) MG Oral Tab CR  (Patient not taking: Reported on 5/29/2024)      CLONAZEPAM OR Take 0.5 mg by mouth daily.   (Patient not taking: Reported on 5/29/2024)      Atorvastatin Calcium (LIPITOR OR) Take 80 mg by mouth daily.   (Patient not taking: Reported on 5/29/2024)         Review of Systems:  Pertinent items are noted in HPI.      Objective:   There were no vitals taken for this visit. Estimated body mass index is 29.41 kg/m² as calculated from the following:    Height as of 2/28/24: 5' 10\" (1.778 m).    Weight as of 2/28/24: 205 lb (93 kg).    No distress  Non labored respirations    Laboratory Data:  Lab Results   Component Value Date    WBC 4.9 01/23/2024    HGB 14.4 01/23/2024    .0 01/23/2024     Lab Results   Component Value Date     01/23/2024    K 4.1 01/23/2024     (H) 01/23/2024    CO2 26.0 01/23/2024    BUN 19 01/23/2024     (H) 01/23/2024    GFRAA 105 06/18/2020    AST 31 01/23/2024    ALT  01/23/2024      Comment:      Due to  backorder we are temporarily unable to offer hospital-based ALT testing at [facility].   If urgently needed, please order ALT test code [id number].   The new order will need a new venipuncture and will be sent to [facility] for testing.   The expected turnaround time will be within 24 hours.     TP 6.0 (L) 01/23/2024    ALB 3.4 01/23/2024    CA 8.3 (L) 01/23/2024    MG 1.9 03/08/2023       Urinalysis Results (last three years):  Recent Labs     07/13/21  1619 11/09/21  1239 10/25/22  1009 01/09/23  1508 03/08/23  0758 05/16/23  1529 08/18/23  1040 01/23/24  1222 05/29/24  1306   COLORUR  --   --  Yellow  --  Yellow  --   --  Light-Yellow  --    CLARITY  --   --  Clear  --  Clear  --   --  Clear  --    SPECGRAVITY  1.015 1.030 1.018 >=1.030 1.021 1.015 1.020 1.021 1.020   PHURINE 5.5 5.5 6.0 6.0 6.0 6.5 5.5 6.5 6.0   PROUR  --   --  Negative  --  Negative  --   --  Negative  --    GLUUR  --   --  Negative  --  Negative  --   --  Normal  --    KETUR  --   --  Negative  --  Trace*  --   --  Negative  --    BILUR  --   --  Negative  --  Negative  --   --  Negative  --    BLOODURINE  --   --  Negative  --  Negative  --   --  Negative  --    NITRITE Negative Negative Negative Negative Negative Negative Negative Negative Negative   UROBILINOGEN  --   --  <2.0  --  <2.0  --   --  Normal  --    LEUUR  --   --  Trace*  --  Negative  --   --  Negative  --    WBCUR  --   --  1-5  --   --   --   --   --   --    RBCUR  --   --  0-2  --   --   --   --   --   --    BACUR  --   --  None Seen  --   --   --   --   --   --        Urine Culture Results (last three years):  Lab Results   Component Value Date    URINECUL No Growth 2 Days 03/18/2023    URINECUL No Growth 2 Days 02/03/2023    URINECUL No Growth 2 Days 10/25/2022       Imaging  No results found.    Assessment & Plan:   BPH with LUTS  UA negative, PVR 46mL  Subjectively symptoms have been stable  Happy with voiding habits   Recommend continue current regimen of tamsulosin 0.8mg and finasteride 5mg daily.  No indication to pursue TURP at this time but he will consider in future.   Follow up in 1 year.    Will be moving to [place] likely next year.      Tonya Menon PA-C, 5/29/2024

## 2024-06-12 RX ORDER — FINASTERIDE 5 MG/1
5 TABLET, FILM COATED ORAL DAILY
Qty: 90 TABLET | Refills: 3 | Status: SHIPPED | OUTPATIENT
Start: 2024-06-12

## 2024-06-12 NOTE — TELEPHONE ENCOUNTER
RN approved the refill request of Finasteride  Last office visit on 5/29/24 with PA    Benign Prostatic Hypertrophy Medications      Protocol Criteria:  Appointment scheduled in the past 12 months or in the next 2 months  If patients is between the ages of 50 and 70 then PSA done within the last 2 years and is either  Less than or equal to 4.0 ng/ml  Or if greater than 4.0 there is no significant increase (>0.5 ng/ml) in PSA over the last 2 years    Recent Outpatient Visits              2 [facility] tract [facility]    [facility] Visit    7 months ago Iron deficiency anemia, unspecified iron deficiency anemia type    [facility],  [facility] Rishabh Salas APRN    Office Visit    9 months ago BPH with obstruction/lower urinary tract symptoms    [facility]    Office Visit    1 year ago Iron deficiency anemia, unspecified iron deficiency anemia type    [facility],  [facility] Owen Berg MD    Nurse Only    1 year ago BPH with obstruction/lower urinary tract symptoms    [facility]    Office Visit                  PSA:  No results found for: \"PSA\"    PSA Screen:  No results found for: \"PSAS\"

## 2024-07-26 RX ORDER — TAMSULOSIN HYDROCHLORIDE 0.4 MG/1
0.8 CAPSULE ORAL EVERY EVENING
Qty: 180 CAPSULE | Refills: 1 | Status: SHIPPED | OUTPATIENT
Start: 2024-07-26

## 2024-07-26 NOTE — TELEPHONE ENCOUNTER
RN approved refill request of Tamsulosin 0.8 mg daily    Last office visit on 05/29/24 with AMRITA Castaneda:   UA negative, PVR 46mL  Subjectively symptoms have been stable  Happy with voiding habits   Recommend continue current regimen of tamsulosin 0.8mg and finasteride 5mg daily.  No indication to pursue TURP at this time but he will consider in future.   Follow up in 1 year. Moving to [place]

## 2024-09-25 ENCOUNTER — HOSPITAL ENCOUNTER (OUTPATIENT)
Dept: MRI IMAGING | Facility: HOSPITAL | Age: 74
Discharge: HOME | End: 2024-09-25
Attending: INTERNAL MEDICINE
Payer: MEDICARE

## 2024-09-25 DIAGNOSIS — K86.2 PANCREAS CYST (HCC): ICD-10-CM

## 2024-09-25 PROCEDURE — 76376 3D RENDER W/INTRP POSTPROCES: CPT | Performed by: INTERNAL MEDICINE

## 2024-09-25 PROCEDURE — 74183 MRI ABD W/O CNTR FLWD CNTR: CPT | Performed by: INTERNAL MEDICINE

## 2024-09-25 PROCEDURE — A9575 INJ GADOTERATE MEGLUMI 0.1ML: HCPCS | Performed by: INTERNAL MEDICINE

## 2024-09-25 RX ORDER — GADOTERATE MEGLUMINE 376.9 MG/ML
20 INJECTION INTRAVENOUS
Status: COMPLETED | OUTPATIENT
Start: 2024-09-25 | End: 2024-09-25

## 2024-09-25 RX ADMIN — GADOTERATE MEGLUMINE 20 ML: 376.9 INJECTION INTRAVENOUS at 09:43:00

## 2025-01-03 RX ORDER — FINASTERIDE 5 MG/1
5 TABLET, FILM COATED ORAL DAILY
Qty: 90 TABLET | Refills: 3 | Status: SHIPPED | OUTPATIENT
Start: 2025-01-03

## 2025-01-16 RX ORDER — TAMSULOSIN HYDROCHLORIDE 0.4 MG/1
0.8 CAPSULE ORAL EVERY EVENING
Qty: 180 CAPSULE | Refills: 0 | Status: SHIPPED | OUTPATIENT
Start: 2025-01-16

## 2025-02-25 ENCOUNTER — OFFICE VISIT (OUTPATIENT)
Dept: SURGERY | Facility: CLINIC | Age: 75
End: 2025-02-25
Payer: MEDICARE

## 2025-02-25 DIAGNOSIS — N13.8 BPH WITH OBSTRUCTION/LOWER URINARY TRACT SYMPTOMS: Primary | ICD-10-CM

## 2025-02-25 DIAGNOSIS — N21.0 BLADDER STONE: ICD-10-CM

## 2025-02-25 DIAGNOSIS — N40.1 BPH WITH OBSTRUCTION/LOWER URINARY TRACT SYMPTOMS: Primary | ICD-10-CM

## 2025-02-25 LAB
APPEARANCE: CLEAR
BILIRUBIN: NEGATIVE
GLUCOSE (URINE DIPSTICK): NEGATIVE MG/DL
KETONES (URINE DIPSTICK): NEGATIVE MG/DL
LEUKOCYTES: NEGATIVE
MULTISTIX LOT#: NORMAL NUMERIC
NITRITE, URINE: NEGATIVE
OCCULT BLOOD: NEGATIVE
PH, URINE: 7 (ref 4.5–8)
PROTEIN (URINE DIPSTICK): NEGATIVE MG/DL
SPECIFIC GRAVITY: 1.02 (ref 1–1.03)
URINE-COLOR: YELLOW
UROBILINOGEN,SEMI-QN: 1 MG/DL (ref 0–1.9)

## 2025-02-25 PROCEDURE — 81003 URINALYSIS AUTO W/O SCOPE: CPT | Performed by: PHYSICIAN ASSISTANT

## 2025-02-25 PROCEDURE — 51798 US URINE CAPACITY MEASURE: CPT | Performed by: PHYSICIAN ASSISTANT

## 2025-02-25 PROCEDURE — 99213 OFFICE O/P EST LOW 20 MIN: CPT | Performed by: PHYSICIAN ASSISTANT

## 2025-02-25 NOTE — PROGRESS NOTES
Subjective:   Dave Hughes is a 74 year old male with history of anxiety, melanoma, TIA, hyperlipidemia, bladder stones s/p cystolitholapaxy in 2020, nephrolithiasis (small bilateral nonobstructing renal calculi), and BPH/LUTS who presents today for follow up.    Last seen May 2024 for same. At that time symptoms were stable and generally satisfied with voiding habits on tamsulosin 0.8mg and finasteride 5mg.     UA today negative and PVR 46mL.     Urine stream stable. Nocturia stable x 2-3, takes ambien nightly. Occasionally has a small amount of leakage. No gross hematuria or dysuria.     Previously had considered consultation with [facility] for HoLep procedure, but had deferred.      Prior PVRs 129mL, 69mL, 46mL.     He was seen originally in October 2022 for gross hematuria. CT scan showed small bilateral non obstructing stone and an 8mm solitary bladder stone, bladder distension with an approximately 90g prostate with significant intravesical protrusion.      He was started on finasteride and taken for treatment of bladder stone 3/29/23. Cystoscopy showed trabeculated bladder with enlarged obstructive prostate and coapting lateral lobes with intravesical large median lobe.     Gross hematuria: none recently  Tobacco history: none  Family history of urologic malignancy: none    History/Other:    Chief Complaint Reviewed and Verified  Nursing Notes Reviewed and   Verified  Tobacco Reviewed  Allergies Reviewed  Medications Reviewed    Social History Reviewed         Current Outpatient Medications   Medication Sig Dispense Refill    PEG 3350-KCl-Na Bicarb-NaCl 420 g Oral Recon Soln Take as directed by physician 4000 mL 0    TAMSULOSIN 0.4 MG Oral Cap Take 2 capsules (0.8 mg total) by mouth every evening. 180 capsule 0    finasteride 5 MG Oral Tab Take 1 tablet (5 mg total) by mouth daily. 90 tablet 3    atorvastatin 80 MG Oral Tab       clonazePAM 0.5 MG Oral Tab       Cholecalciferol 25 MCG (1000 UT) Oral  Tab Take 25 mcg by mouth daily.      Ferrous Sulfate ER (SLOW FE) 142 (45 Fe) MG Oral Tab CR  (Patient not taking: Reported on 5/29/2024)      ezetimibe 10 MG Oral Tab Take 1 tablet (10 mg total) by mouth nightly.      CLONAZEPAM OR Take 0.5 mg by mouth daily.   (Patient not taking: Reported on 5/29/2024)      ZOLPIDEM TARTRATE OR Take 10 mg by mouth nightly.        Venlafaxine HCl (EFFEXOR XR OR) Take 225 mg by mouth daily.        Atorvastatin Calcium (LIPITOR OR) Take 80 mg by mouth daily.   (Patient not taking: Reported on 5/29/2024)      Aspirin 81 MG Oral Cap Take 81 mg by mouth daily.         Review of Systems:  Pertinent items are noted in HPI.      Objective:   There were no vitals taken for this visit. Estimated body mass index is 29.41 kg/m² as calculated from the following:    Height as of 2/28/24: 5' 10\" (1.778 m).    Weight as of 2/28/24: 205 lb (93 kg).    No distress  Non labored respirations    Laboratory Data:  Lab Results   Component Value Date    WBC 4.9 01/23/2024    HGB 14.4 01/23/2024    .0 01/23/2024     Lab Results   Component Value Date     01/23/2024    K 4.1 01/23/2024     (H) 01/23/2024    CO2 26.0 01/23/2024    BUN 19 01/23/2024     (H) 01/23/2024    GFRAA 105 06/18/2020    AST 31 01/23/2024    ALT  01/23/2024      Comment:      Due to  backorder we are temporarily unable to offer hospital-based ALT testing at [facility].   If urgently needed, please order ALT test code [id number].   The new order will need a new venipuncture and will be sent to [facility] for testing.   The expected turnaround time will be within 24 hours.     TP 6.0 (L) 01/23/2024    ALB 3.4 01/23/2024    CA 8.3 (L) 01/23/2024    MG 1.9 03/08/2023       Urinalysis Results (last three years):  Recent Labs     10/25/22  1009 01/09/23  1508 03/08/23  0758 05/16/23  1529 08/18/23  1040 01/23/24  1222 05/29/24  1306   COLORUR Yellow  --  Yellow  --   --  Light-Yellow  --    CLARITY Clear   --  Clear  --   --  Clear  --    SPECGRAVITY 1.018 >=1.030 1.021 1.015 1.020 1.021 1.020   PHURINE 6.0 6.0 6.0 6.5 5.5 6.5 6.0   PROUR Negative  --  Negative  --   --  Negative  --    GLUUR Negative  --  Negative  --   --  Normal  --    KETUR Negative  --  Trace*  --   --  Negative  --    BILUR Negative  --  Negative  --   --  Negative  --    BLOODURINE Negative  --  Negative  --   --  Negative  --    NITRITE Negative Negative Negative Negative Negative Negative Negative   UROBILINOGEN <2.0  --  <2.0  --   --  Normal  --    LEUUR Trace*  --  Negative  --   --  Negative  --    WBCUR 1-5  --   --   --   --   --   --    RBCUR 0-2  --   --   --   --   --   --    BACUR None Seen  --   --   --   --   --   --        Urine Culture Results (last three years):  Lab Results   Component Value Date    URINECUL No Growth 2 Days 03/18/2023    URINECUL No Growth 2 Days 02/03/2023    URINECUL No Growth 2 Days 10/25/2022       Imaging  No results found.    Assessment & Plan:   BPH with LUTS  Overall stable, residual stable  Recommend continuation of current regimen of finasteride and tamsulosin 0.8mg    Will be moving to [place] in May-June 2025. OK for refills until 2/2026.      Tonya Menon PA-C, 2/25/2025

## 2025-04-14 RX ORDER — TAMSULOSIN HYDROCHLORIDE 0.4 MG/1
0.8 CAPSULE ORAL EVERY EVENING
Qty: 180 CAPSULE | Refills: 0 | Status: SHIPPED | OUTPATIENT
Start: 2025-04-14

## 2025-04-22 PROBLEM — D12.0 BENIGN NEOPLASM OF CECUM: Status: ACTIVE | Noted: 2025-04-22

## 2025-04-22 PROBLEM — D12.5 BENIGN NEOPLASM OF SIGMOID COLON: Status: ACTIVE | Noted: 2025-04-22

## 2025-04-22 PROBLEM — Z86.0101 PERSONAL HISTORY OF ADENOMATOUS AND SERRATED COLON POLYPS: Status: ACTIVE | Noted: 2024-03-15

## 2025-04-22 PROBLEM — D12.2 BENIGN NEOPLASM OF ASCENDING COLON: Status: ACTIVE | Noted: 2025-04-22

## 2025-04-22 PROBLEM — D12.3 BENIGN NEOPLASM OF TRANSVERSE COLON: Status: ACTIVE | Noted: 2025-04-22

## 2025-06-30 ENCOUNTER — PATIENT MESSAGE (OUTPATIENT)
Dept: SURGERY | Facility: CLINIC | Age: 75
End: 2025-06-30

## 2025-07-11 RX ORDER — TAMSULOSIN HYDROCHLORIDE 0.4 MG/1
0.8 CAPSULE ORAL EVERY EVENING
Qty: 180 CAPSULE | Refills: 2 | Status: SHIPPED | OUTPATIENT
Start: 2025-07-11

## (undated) DEVICE — 1200CC GUARDIAN II: Brand: GUARDIAN

## (undated) DEVICE — FLEXIVA  PULSE  AND  FLEXIVA  PULSE  TRACTIP  LASER  FIBERS  ARE  HIGH  POWER  SINGLE-USE FIBER: Brand: FLEXIVA PULSE ID

## (undated) DEVICE — KENDALL SCD EXPRESS SLEEVES, KNEE LENGTH, MEDIUM: Brand: KENDALL SCD

## (undated) DEVICE — CYSTO CDS-LF: Brand: MEDLINE INDUSTRIES, INC.

## (undated) DEVICE — Device

## (undated) DEVICE — 3M™ RED DOT™ MONITORING ELECTRODE WITH FOAM TAPE AND STICKY GEL, 50/BAG, 20/CASE, 72/PLT 2570: Brand: RED DOT™

## (undated) DEVICE — STERILE POLYISOPRENE POWDER-FREE SURGICAL GLOVES: Brand: PROTEXIS

## (undated) DEVICE — FILTERLINE NASAL ADULT O2/CO2

## (undated) DEVICE — SLEEVE KENDALL SCD EXPRESS MED

## (undated) DEVICE — Device: Brand: DEFENDO AIR/WATER/SUCTION AND BIOPSY VALVE

## (undated) DEVICE — ENDOSCOPY PACK UPPER: Brand: MEDLINE INDUSTRIES, INC.

## (undated) DEVICE — TIGERTAIL 5F FLXTIP 70CM

## (undated) DEVICE — SOLUTION  .9 3000ML

## (undated) DEVICE — SOL  .9 3000ML

## (undated) NOTE — ED AVS SNAPSHOT
[facility] Emergency Department    [place]  [address] [address]    Phone:  [phone]    Fax:  [address]   MRN: [id number]    Department:  [facility] Emergency Department   Date of Visit:  3/15/2 Disclosure     Insurance plans vary and the physician(s) referred by the ER may not be covered by your plan. Please contact your insurance company to determine coverage for follow-up care and referrals.     [facility] [employer] [place] [phone] prescription right away and begin taking the medication(s) as directed    If the emergency physician has read X-rays, these will be re-interpreted by a radiologist.  If there is a significant change in your reading, you will be contacted.  Please make sure Medicaid plans. To get signed up and covered, please call [phone] and ask to get set up for an insurance coverage that is in-network with [employer].         Imaging Results         XR CHEST PA + LAT CHEST (CPT=71020) (Final result) Resu medical emergencies, dial 911.

## (undated) NOTE — ED AVS SNAPSHOT
[facility] Emergency Department    [place]  [address] [address]    Phone:  [phone]    Fax:  [address]   MRN: [id number]    Department:  [facility] Emergency Department   Date of Visit:  3/15/2 IF THERE IS ANY CHANGE OR WORSENING OF YOUR CONDITION, CALL YOUR PRIMARY CARE PHYSICIAN AT ONCE OR RETURN IMMEDIATELY TO THE EMERGENCY DEPARTMENT.     If you have been prescribed any medication(s), please fill your prescription right away and begin taking t

## (undated) NOTE — Clinical Note
Please send copy of my last note, operative report and bladder stone analysis to patient's PCP who I believe may be at [facility].

## (undated) NOTE — LETTER
[address] [facility] [place] FOR BLOOD OR BLOOD COMPONENTS      In the course of your treatment, it may become necessary to administer a transfusion of blood or blood components. This form provides basic information concerning this procedure and, if signed by you, authorizes its performance by qualified medical personnel. DESCRIPTION OF PROCEDURE:  Blood is introduced into one of your veins, commonly in the arm, using a sterilized disposable needle. The amount of blood transfused, and whether the transfusion will be of blood or blood components is a judgment the physician will make based on your particular needs. RISKS:  The transfusion is a common procedure of low risk. MINOR AND TEMPORARY REACTIONS ARE NOT UNCOMMON, including a slight bruise, swelling or local reaction in the area where the needle pierces your skin, or a non-serious reaction to the transfused material itself, including headache, fever or a mild skin reaction, such as rash. Serious reactions are possible, though very unlikely and include severe allergic reaction (shock)  and destruction (hemolysis) of transfused blood cells. Infectious diseases which are known to be transmitted by blood transfusion include CERTAIN TYPES OF VIRAL HEPATITIS, a viral infection of the liver, HUMAN IMMUNODEFICIENCY VIRUS (HIV-1,2) infection, a viral infection known to cause ACQUIRED IMMUNODEFICIENCY SYNDROME (AIDS) AS WELL AS CERTAIN OTHER BACTERIAL, VIRAL AND PARASITIC DISEASES. While a minimal risk of acquiring an infectious disease from transfused blood exists, in accordance with Federal and State law all due care has been taken in donor selection and testing to avoid transmission of disease. ALTERNATIVES:  If loss of blood poses serious threats in the course of your treatment, THERE IS NO EFFECTIVE ALTERNATIVE TO BLOOD TRANSFUSION.  However, if you have any further questions on this matter, your physician will fully explain the alternatives to you if it has not already been done. I,Dave Hughes, have read/had read to me the above. I understand the matters bearing on the decision whether or not to authorize a transfusion of blood or blood components. I have no questions which have not been answered to my full satisfaction.  I hereby consent to such transfusion as  my physician may deem necessary or advisable in the course of my treatment.        _______________   __________________________________________________  Date     Signature of Patient, Parent or Legal Guardian      ([employer] [employer])      __________________________________________  Witness to Signature (title or relationship to patient)    Patient Name: Khang Mooney     : 10/30/1950                 Printed: 2022     Medical Record #: [id number]                    Page 1 of 1